# Patient Record
Sex: FEMALE | Race: WHITE | Employment: OTHER | URBAN - METROPOLITAN AREA
[De-identification: names, ages, dates, MRNs, and addresses within clinical notes are randomized per-mention and may not be internally consistent; named-entity substitution may affect disease eponyms.]

---

## 2017-07-06 ENCOUNTER — OFFICE VISIT (OUTPATIENT)
Dept: FAMILY MEDICINE | Facility: CLINIC | Age: 82
End: 2017-07-06

## 2017-07-06 VITALS
OXYGEN SATURATION: 98 % | TEMPERATURE: 97.5 F | SYSTOLIC BLOOD PRESSURE: 110 MMHG | HEIGHT: 62 IN | WEIGHT: 139.2 LBS | BODY MASS INDEX: 25.62 KG/M2 | HEART RATE: 83 BPM | DIASTOLIC BLOOD PRESSURE: 70 MMHG

## 2017-07-06 DIAGNOSIS — Z23 NEED FOR VACCINATION: ICD-10-CM

## 2017-07-06 DIAGNOSIS — Z13.9 SCREENING FOR CONDITION: ICD-10-CM

## 2017-07-06 DIAGNOSIS — Z91.89 AT RISK FOR OSTEOPOROSIS: ICD-10-CM

## 2017-07-06 DIAGNOSIS — I10 ESSENTIAL HYPERTENSION, BENIGN: ICD-10-CM

## 2017-07-06 DIAGNOSIS — E11.9 CONTROLLED TYPE 2 DIABETES MELLITUS WITHOUT COMPLICATION, WITHOUT LONG-TERM CURRENT USE OF INSULIN (H): Primary | ICD-10-CM

## 2017-07-06 LAB
ERYTHROCYTE [DISTWIDTH] IN BLOOD BY AUTOMATED COUNT: 10.7 %
HBA1C MFR BLD: 6.5 % (ref 4–7)
HCT VFR BLD AUTO: 38.1 % (ref 35–47)
HEMOGLOBIN: 13.2 G/DL (ref 11.7–15.7)
MCH RBC QN AUTO: 32.7 PG (ref 26–33)
MCHC RBC AUTO-ENTMCNC: 34.6 G/DL (ref 31–36)
MCV RBC AUTO: 94.4 FL (ref 78–100)
PLATELET COUNT - QUEST: 159 10^9/L (ref 150–375)
RBC # BLD AUTO: 4.04 10*12/L (ref 3.8–5.2)
WBC # BLD AUTO: 6.6 10*9/L (ref 4–11)

## 2017-07-06 PROCEDURE — 36415 COLL VENOUS BLD VENIPUNCTURE: CPT | Performed by: FAMILY MEDICINE

## 2017-07-06 PROCEDURE — 85027 COMPLETE CBC AUTOMATED: CPT | Performed by: FAMILY MEDICINE

## 2017-07-06 PROCEDURE — 83036 HEMOGLOBIN GLYCOSYLATED A1C: CPT | Performed by: FAMILY MEDICINE

## 2017-07-06 PROCEDURE — 99214 OFFICE O/P EST MOD 30 MIN: CPT | Performed by: FAMILY MEDICINE

## 2017-07-06 PROCEDURE — 82607 VITAMIN B-12: CPT | Mod: 90 | Performed by: FAMILY MEDICINE

## 2017-07-06 PROCEDURE — 80076 HEPATIC FUNCTION PANEL: CPT | Mod: 90 | Performed by: FAMILY MEDICINE

## 2017-07-06 PROCEDURE — 80048 BASIC METABOLIC PNL TOTAL CA: CPT | Mod: 90 | Performed by: FAMILY MEDICINE

## 2017-07-06 PROCEDURE — 80061 LIPID PANEL: CPT | Mod: 90 | Performed by: FAMILY MEDICINE

## 2017-07-06 RX ORDER — METFORMIN HCL 500 MG
500 TABLET, EXTENDED RELEASE 24 HR ORAL 2 TIMES DAILY WITH MEALS
Qty: 180 TABLET | Refills: 0 | Status: SHIPPED | OUTPATIENT
Start: 2017-07-06 | End: 2017-07-06

## 2017-07-06 RX ORDER — METFORMIN HCL 500 MG
500 TABLET, EXTENDED RELEASE 24 HR ORAL 2 TIMES DAILY WITH MEALS
Qty: 180 TABLET | Refills: 0 | COMMUNITY
Start: 2017-07-06 | End: 2017-11-30

## 2017-07-06 RX ORDER — LISINOPRIL 5 MG/1
5 TABLET ORAL DAILY
Qty: 90 TABLET | Refills: 1 | Status: SHIPPED | OUTPATIENT
Start: 2017-07-06 | End: 2018-04-07

## 2017-07-06 RX ORDER — ROSUVASTATIN CALCIUM 5 MG/1
5 TABLET, COATED ORAL DAILY
Qty: 90 TABLET | Refills: 3 | Status: SHIPPED | OUTPATIENT
Start: 2017-07-06 | End: 2018-11-09

## 2017-07-06 RX ORDER — LISINOPRIL 10 MG/1
10 TABLET ORAL DAILY
Qty: 90 TABLET | Refills: 1 | Status: CANCELLED | OUTPATIENT
Start: 2017-07-06

## 2017-07-06 RX ORDER — ROSUVASTATIN CALCIUM 5 MG/1
5 TABLET, COATED ORAL DAILY
Qty: 90 TABLET | Refills: 0 | Status: SHIPPED | OUTPATIENT
Start: 2017-07-06 | End: 2017-11-30

## 2017-07-06 NOTE — PROGRESS NOTES
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    STATIN INTOLERANT (stopped low dose pravastatin- normal CK)- recommended a Trial of low dose crestor- willing to try after discussing her risk factors for heart disease    Recheck CBC (last done 2015) on asa therapy    Problem list and histories reviewed & adjusted, as indicated.  Additional history: as documented    Patient Active Problem List   Diagnosis     Essential hypertension, benign     Disorder of bone and cartilage     Neurogenic bladder     Hyperlipidemia     Diabetes mellitus type II, controlled, with no complications (H)     ACP (advance care planning)     Lumbar disc herniation with radiculopathy     Health Care Home     Past Surgical History:   Procedure Laterality Date     C APPENDECTOMY  1996     C VAGINAL HYSTERECTOMY  2007    Hysterectomy, Vaginal     CYSTOSCOPY  2006    urethral dilation     HC COLONOSCOPY THRU STOMA, DIAGNOSTIC  5/2002    Normal     HC DILATION/CURETTAGE DIAG/THER NON OB  10/2005    Dr Fisher     SIGMOIDOSCOPY,DIAGNOSTIC  1997       Social History   Substance Use Topics     Smoking status: Never Smoker     Smokeless tobacco: Never Used     Alcohol use No     Family History   Problem Relation Age  of Onset     DIABETES Brother      DIABETES Sister      CANCER Father      lung -      Hypertension Mother      OSTEOPOROSIS Mother      bilateral hip fractures, in her 70's     Breast Cancer Sister      CANCER Sister      leukemia         Current Outpatient Prescriptions   Medication Sig Dispense Refill     rosuvastatin (CRESTOR) 5 MG tablet Take 1 tablet (5 mg) by mouth daily 90 tablet 3     lisinopril (PRINIVIL/ZESTRIL) 5 MG tablet Take 1 tablet (5 mg) by mouth daily 90 tablet 1     rosuvastatin (CRESTOR) 5 MG tablet Take 1 tablet (5 mg) by mouth daily 90 tablet 0     metFORMIN (GLUCOPHAGE-XR) 500 MG 24 hr tablet Take 1 tablet (500 mg) by mouth 2 times daily (with meals) 180 tablet 0     sulfamethoxazole-trimethoprim (BACTRIM,SEPTRA) 400-80 MG per tablet Take 1 tablet by mouth 2 times daily 20 tablet 0     Cyanocobalamin (B-12) 1000 MCG CAPS        glucose blood VI test strips (ONE TOUCH ULTRA TEST) strip 1 Box by In Vitro route daily. 100 strip prn     fish oil-omega-3 fatty acids (FISH OIL) 1000 MG capsule Take 2 g by mouth daily.       ASPIRIN 81 MG OR TABS TWO DAILY 100 3     [DISCONTINUED] metFORMIN (GLUCOPHAGE-XR) 500 MG 24 hr tablet Take 1 tablet (500 mg) by mouth 2 times daily (with meals) 180 tablet 0     [DISCONTINUED] lisinopril (PRINIVIL,ZESTRIL) 10 MG tablet Take 1 tablet (10 mg) by mouth daily 90 tablet 1     [DISCONTINUED] metFORMIN (GLUCOPHAGE-XR) 500 MG 24 hr tablet Take 1 tablet (500 mg) by mouth 2 times daily (with meals) 180 tablet 0       Reviewed and updated as needed this visit by clinical staff       Reviewed and updated as needed this visit by Provider             ROS:  C: NEGATIVE for fever, chills, change in weight  E/M: NEGATIVE for ear, mouth and throat problems  R: NEGATIVE for significant cough or SOB  CV: NEGATIVE for chest pain, palpitations or peripheral edema  Self cath: had some antibiotics/self treated for - recent UTI    OBJECTIVE:     /70 (BP Location: Right  "arm, Patient Position: Chair, Cuff Size: Adult Regular)  Pulse 83  Temp 97.5  F (36.4  C) (Oral)  Ht 1.581 m (5' 2.25\")  Wt 63.1 kg (139 lb 3.2 oz)  SpO2 98%  Breastfeeding? No  BMI 25.26 kg/m2  Body mass index is 25.26 kg/(m^2).     136/70 on left arm    Home pressures:  129/79  113/72  112/67  117/70  Regular rate and  rhythm. S1 and S2 normal, no murmurs, clicks, gallops or rubs. No edema or JVD. Chest is clear; no wheezes or rales.    Full peripheral pulses- she denies symptoms of claudication  Normal foot exam including filament testing    Diagnostic Test Results:  Results for orders placed or performed in visit on 17 (from the past 24 hour(s))   HEMOGRAM/PLATELET (BFP)   Result Value Ref Range    WBC 6.6 4.0 - 11 10*9/L    RBC Count 4.04 3.8 - 5.2 10*12/L    Hemoglobin 13.2 11.7 - 15.7 g/dL    Hematocrit 38.1 35.0 - 47.0 %    MCV 94.4 78 - 100 fL    MCH 32.7 26 - 33 pg    MCHC 34.6 31 - 36 g/dL    RDW 10.7 %    Platelet Count 159 150 - 375 10^9/L    Narrative    Ran Test 3 times - [facility]   Hemoglobin A1c (BFP)   Result Value Ref Range    Hemoglobin A1C 6.5 4.0 - 7.0 %       Mother  of complications of osteoporosis age 84- DEXA recommended    ASSESSMENT/PLAN:     Problem List Items Addressed This Visit     Diabetes mellitus type II, controlled, with no complications (H) - Primary    Relevant Orders    BASIC METABOLIC PANEL (QUEST)    Lipid Profile    Hemoglobin A1c (BFP) (Completed)    VENOUS COLLECTION (Completed)    HEPATIC PANEL (QUEST)    HEMOGRAM/PLATELET (BFP) (Completed)      Other Visit Diagnoses     Need for vaccination             (discussed need for tetanus booster- she will get at drug store)        MEDICATIONS:        - Resume metformin       - Trial of Crestor- recheck fasting lipid profile in 3 months       - Decrease lisinopril to 5 mg       - Continue other medications without change  FUTURE APPOINTMENTS:       - Follow-up visit in 3 months with fasting labs and recheck blood " pressure    Schedule DEXA SCAN    Loraine Garvey MD  [facility]

## 2017-07-06 NOTE — MR AVS SNAPSHOT
"              After Visit Summary   7/6/2017    Amina Matute    MRN: [id number]           Patient Information     Date Of Birth          1935        Visit Information        Provider Department      7/6/2017 8:45 AM Loraine Garvey MD [facility]        Today's Diagnoses     Controlled type 2 diabetes mellitus without complication, without long-term current use of insulin (H)    -  1    Need for vaccination        Screening for condition          Care Instructions    Due for tetanus shot (Boostix) tetanus , diptheria, pertussis-    Prevnar (pneumonia)  vaccination after November 4    Keep B12    New medication : Crestor (rosuvastatin)- for cholesterol    Continue metformin    Lisinopril dose reduced to 5 mg    RECHECK IN THREE MONTHS            Follow-ups after your visit        Who to contact     If you have questions or need follow up information about today's clinic visit or your schedule please contact [facility] directly at [phone].  Normal or non-critical lab and imaging results will be communicated to you by MyChart, letter or phone within 4 business days after the clinic has received the results. If you do not hear from us within 7 days, please contact the clinic through MyChart or phone. If you have a critical or abnormal lab result, we will notify you by phone as soon as possible.  Submit refill requests through [employer] or call your pharmacy and they will forward the refill request to us. Please allow 3 business days for your refill to be completed.          Additional Information About Your Visit        Care EveryWhere ID     This is your Care EveryWhere ID. This could be used by other organizations to access your [place] medical records  [id number]        Your Vitals Were     Pulse Temperature Height Pulse Oximetry Breastfeeding? BMI (Body Mass Index)    83 97.5  F (36.4  C) (Oral) 1.581 m (5' 2.25\") 98% No 25.26 kg/m2       Blood " Pressure from Last 3 Encounters:   07/06/17 110/70   12/27/16 158/64   11/04/16 114/80    Weight from Last 3 Encounters:   07/06/17 63.1 kg (139 lb 3.2 oz)   12/27/16 67.9 kg (149 lb 12.8 oz)   11/04/16 64.4 kg (142 lb)              We Performed the Following     BASIC METABOLIC PANEL (QUEST)     Hemoglobin A1c (BFP)     HEMOGRAM/PLATELET (BFP)     HEPATIC PANEL (QUEST)     Lipid Profile     VENOUS COLLECTION     VITAMIN B12 (QUEST)          Today's Medication Changes          These changes are accurate as of: 7/6/17  9:34 AM.  If you have any questions, ask your nurse or doctor.               Start taking these medicines.        Dose/Directions    * rosuvastatin 5 MG tablet   Commonly known as:  CRESTOR   Used for:  Controlled type 2 diabetes mellitus without complication, without long-term current use of insulin (H)   Started by:  Loraine Garvey MD        Dose:  5 mg   Take 1 tablet (5 mg) by mouth daily   Quantity:  90 tablet   Refills:  3       * rosuvastatin 5 MG tablet   Commonly known as:  CRESTOR   Used for:  Controlled type 2 diabetes mellitus without complication, without long-term current use of insulin (H)   Started by:  Loraine Garvey MD        Dose:  5 mg   Take 1 tablet (5 mg) by mouth daily   Quantity:  90 tablet   Refills:  0       * Notice:  This list has 2 medication(s) that are the same as other medications prescribed for you. Read the directions carefully, and ask your doctor or other care provider to review them with you.      These medicines have changed or have updated prescriptions.        Dose/Directions    lisinopril 5 MG tablet   Commonly known as:  PRINIVIL/ZESTRIL   This may have changed:    - medication strength  - how much to take   Used for:  Controlled type 2 diabetes mellitus without complication, without long-term current use of insulin (H)   Changed by:  Loraine Garvey MD        Dose:  5 mg   Take 1 tablet (5 mg) by mouth daily   Quantity:  90 tablet   Refills:  1          Stop taking these medicines if you haven't already. Please contact your care team if you have questions.     metFORMIN 500 MG 24 hr tablet   Commonly known as:  GLUCOPHAGE-XR   Stopped by:  Loraine Garvey MD                Where to get your medicines      These medications were sent to [id number] - [facility] [address]  [address] [place] [address]     Phone:  [phone]     lisinopril 5 MG tablet    rosuvastatin 5 MG tablet    rosuvastatin 5 MG tablet                Primary Care Provider Office Phone # Fax #    Loraine Garvey [phone]       [facility] [address]   [facility] [place] [address]        Equal Access to Services     MORRIS MARCANO : Hadii aad ku hadasho Soomaali, waaxda luqadaha, qaybta kaalmada adeegyada, waxay idiin haycharlyn angel bruno . So [facility] [phone].    ATENCIÓN: Si habla español, tiene a carreno disposición servicios gratuitos de asistencia lingüística. Ll[facility] [phone].    We comply with applicable federal civil rights laws and [place] laws. We do not discriminate on the basis of race, color, national origin, age, disability sex, sexual orientation or gender identity.            Thank you!     Thank you for choosing [facility]  for your care. Our goal is always to provide you with excellent care. Hearing back from our patients is one way we can continue to improve our services. Please take a few minutes to complete the written survey that you may receive in the mail after your visit with us. Thank you!             Your Updated Medication List - Protect others around you: Learn how to safely use, store and throw away your medicines at www.disposemymeds.org.          This list is accurate as of: 7/6/17  9:34 AM.  Always use your most recent med list.                   Brand Name Dispense Instructions for use Diagnosis    aspirin 81 MG tablet     100    TWO  DAILY    Essential hypertension, benign, Other and unspecified hyperlipidemia       B-12 1000 MCG Caps           blood glucose monitoring test strip    ONE TOUCH ULTRA    100 strip    1 Box by In Vitro route daily.    Type II or unspecified type diabetes mellitus without mention of complication, not stated as uncontrolled       fish oil-omega-3 fatty acids 1000 MG capsule      Take 2 g by mouth daily.    Other and unspecified hyperlipidemia       lisinopril 5 MG tablet    PRINIVIL/ZESTRIL    90 tablet    Take 1 tablet (5 mg) by mouth daily    Controlled type 2 diabetes mellitus without complication, without long-term current use of insulin (H)       * rosuvastatin 5 MG tablet    CRESTOR    90 tablet    Take 1 tablet (5 mg) by mouth daily    Controlled type 2 diabetes mellitus without complication, without long-term current use of insulin (H)       * rosuvastatin 5 MG tablet    CRESTOR    90 tablet    Take 1 tablet (5 mg) by mouth daily    Controlled type 2 diabetes mellitus without complication, without long-term current use of insulin (H)       sulfamethoxazole-trimethoprim 400-80 MG per tablet    BACTRIM/SEPTRA    20 tablet    Take 1 tablet by mouth 2 times daily    Urinary tract infection, site unspecified       * Notice:  This list has 2 medication(s) that are the same as other medications prescribed for you. Read the directions carefully, and ask your doctor or other care provider to review them with you.

## 2017-07-06 NOTE — LETTER
[facility]  [facility] [address]  [place]  [address]    July 12, 2017        Amina Matute  [address]  [facility] [address]              Dear Amina Matute      LAB RESULTS:     The results of your recent CBC Panel,  Kidney Tests, Liver Panel and Potassium were NORMAL.     Lipid profile is abnormal/ a trial of Crestor is recommended - repeat fasting lipid profile in three months.    Resume Metformin despite a Hemoglobin A1C of 6.5 showing good control of your diabetis.     If you have any further questions or problems, please contact our office at [phone].          Loraine Garvey MD

## 2017-07-06 NOTE — PATIENT INSTRUCTIONS
Due for tetanus shot (Boostix) tetanus , diptheria, pertussis-    Prevnar (pneumonia)  vaccination after November 4    Keep B12    New medication : Crestor (rosuvastatin)- for cholesterol    Continue metformin    Lisinopril dose reduced to 5 mg    RECHECK IN THREE MONTHS

## 2017-07-07 LAB
ALBUMIN SERPL-MCNC: 4.2 G/DL (ref 3.6–5.1)
ALBUMIN/GLOB SERPL: 1.4 (CALC) (ref 1–2.5)
ALP SERPL-CCNC: 97 U/L (ref 33–130)
ALT SERPL-CCNC: 10 U/L (ref 6–29)
AST SERPL-CCNC: 13 U/L (ref 10–35)
BILIRUB SERPL-MCNC: 0.4 MG/DL (ref 0.2–1.2)
BILIRUBIN, DIRECT: 0.1 MG/DL (ref 0–0.5)
BILIRUBIN,INDIRECT: 0.3 MG/DL (CALC) (ref 0.2–1.2)
BUN SERPL-MCNC: 22 MG/DL (ref 7–25)
BUN/CREATININE RATIO: 24 (CALC) (ref 6–22)
CALCIUM SERPL-MCNC: 9.7 MG/DL (ref 8.6–10.4)
CHLORIDE SERPLBLD-SCNC: 103 MMOL/L (ref 98–110)
CHOLEST SERPL-MCNC: 244 MG/DL (ref 125–200)
CHOLEST/HDLC SERPL: 4 (CALC)
CO2 SERPL-SCNC: 22 MMOL/L (ref 20–31)
CREAT SERPL-MCNC: 0.91 MG/DL (ref 0.6–0.88)
EGFR AFRICAN AMERICAN - QUEST: 69 ML/MIN/1.73M2
GFR SERPL CREATININE-BSD FRML MDRD: 60 ML/MIN/1.73M2
GLOBULIN, CALCULATED - QUEST: 2.9 G/DL (CALC) (ref 1.9–3.7)
GLUCOSE - QUEST: 124 MG/DL (ref 65–99)
HDLC SERPL-MCNC: 61 MG/DL
LDLC SERPL CALC-MCNC: 152 MG/DL (CALC)
NONHDLC SERPL-MCNC: 183 MG/DL (CALC)
POTASSIUM SERPL-SCNC: 4.5 MMOL/L (ref 3.5–5.3)
PROT SERPL-MCNC: 7.1 G/DL (ref 6.1–8.1)
SODIUM SERPL-SCNC: 138 MMOL/L (ref 135–146)
TRIGL SERPL-MCNC: 156 MG/DL
VIT B12 SERPL-MCNC: 679 PG/ML (ref 200–1100)

## 2017-07-14 ENCOUNTER — OFFICE VISIT (OUTPATIENT)
Dept: FAMILY MEDICINE | Facility: CLINIC | Age: 82
End: 2017-07-14

## 2017-07-14 VITALS
BODY MASS INDEX: 25.22 KG/M2 | WEIGHT: 139 LBS | DIASTOLIC BLOOD PRESSURE: 70 MMHG | HEART RATE: 71 BPM | SYSTOLIC BLOOD PRESSURE: 134 MMHG | OXYGEN SATURATION: 99 % | TEMPERATURE: 97.7 F

## 2017-07-14 DIAGNOSIS — Z91.89 AT RISK FOR OSTEOPOROSIS: ICD-10-CM

## 2017-07-14 DIAGNOSIS — N39.0 URINARY TRACT INFECTION, SITE UNSPECIFIED: ICD-10-CM

## 2017-07-14 PROCEDURE — 77080 DXA BONE DENSITY AXIAL: CPT | Mod: GA | Performed by: FAMILY MEDICINE

## 2017-07-14 PROCEDURE — 99212 OFFICE O/P EST SF 10 MIN: CPT | Performed by: FAMILY MEDICINE

## 2017-07-14 RX ORDER — SULFAMETHOXAZOLE AND TRIMETHOPRIM 400; 80 MG/1; MG/1
1 TABLET ORAL 2 TIMES DAILY
Qty: 20 TABLET | Refills: 0 | Status: SHIPPED | OUTPATIENT
Start: 2017-07-14 | End: 2017-11-30

## 2017-07-14 NOTE — PROGRESS NOTES
SUBJECTIVE:  Reviewed her dexa scan results- scanned in epic    T score: normal spine  -1.2 total hip (nearly 9 per cent decline in the last 7 years)  -1.8 femoral neck (down from -1.3 in 7 years    Treatment:  Vitamin D 1000 IU daily - she is not taking  Calcium- three dairy products a day  Regular weight bearing exercise- she is physically active    Recheck DEXA 3-5 years    She also requests refill of Bactrim for infrequent UTI related to self cath    More than 50% of visit spent in counseling.

## 2017-07-14 NOTE — MR AVS SNAPSHOT
After Visit Summary   7/14/2017    Amina Matute    MRN: [id number]           Patient Information     Date Of Birth          1935        Visit Information        Provider Department      7/14/2017 11:30 AM Loraine Garvey MD [facility]        Today's Diagnoses     At risk for osteoporosis        Urinary tract infection, site unspecified           Follow-ups after your visit        Who to contact     If you have questions or need follow up information about today's clinic visit or your schedule please contact [facility] directly at [phone].  Normal or non-critical lab and imaging results will be communicated to you by MyChart, letter or phone within 4 business days after the clinic has received the results. If you do not hear from us within 7 days, please contact the clinic through MyChart or phone. If you have a critical or abnormal lab result, we will notify you by phone as soon as possible.  Submit refill requests through [employer] or call your pharmacy and they will forward the refill request to us. Please allow 3 business days for your refill to be completed.          Additional Information About Your Visit        Care EveryWhere ID     This is your Care EveryWhere ID. This could be used by other organizations to access your [place] medical records  [id number]        Your Vitals Were     Pulse Temperature Pulse Oximetry Breastfeeding? BMI (Body Mass Index)       71 97.7  F (36.5  C) (Oral) 99% No 25.22 kg/m2        Blood Pressure from Last 3 Encounters:   07/14/17 134/70   07/06/17 110/70   12/27/16 158/64    Weight from Last 3 Encounters:   07/14/17 63 kg (139 lb)   07/06/17 63.1 kg (139 lb 3.2 oz)   12/27/16 67.9 kg (149 lb 12.8 oz)              We Performed the Following     Dexa hip/pelvis/spine*          Where to get your medicines      These medications were sent to [facility] [address] [place] [address] [address] [place] [address]  [address] [address]     Phone:  [phone]     sulfamethoxazole-trimethoprim 400-80 MG per tablet          Primary Care Provider Office Phone # Fax #    Loraine Garvey [phone]       [facility] [address] [facility] [address]  [facility] [address]        Equal Access to Services     [facility]: Hadii aad ku hadasho Soomaali, waaxda luqadaha, qaybta kaalmada adeegyada, waxay idiin hayaan adeeg kharash la'aan . So [facility] [phone].    ATENCIÓN: Si habla español, tiene a carreno disposición servicios gratuitos de asistencia lingüística. Llame al [phone].    We comply with applicable federal civil rights laws and [place] laws. We do not discriminate on the basis of race, color, national origin, age, disability sex, sexual orientation or gender identity.            Thank you!     Thank you for choosing [facility]  for your care. Our goal is always to provide you with excellent care. Hearing back from our patients is one way we can continue to improve our services. Please take a few minutes to complete the written survey that you may receive in the mail after your visit with us. Thank you!             Your Updated Medication List - Protect others around you: Learn how to safely use, store and throw away your medicines at www.disposemymeds.org.          This list is accurate as of: 7/14/17 12:01 PM.  Always use your most recent med list.                   Brand Name Dispense Instructions for use Diagnosis    aspirin 81 MG tablet     100    TWO DAILY    Essential hypertension, benign, Other and unspecified hyperlipidemia       B-12 1000 MCG Caps           blood glucose monitoring test strip    ONE TOUCH ULTRA    100 strip    1 Box by In Vitro route daily.    Type II or unspecified type diabetes mellitus without mention of complication, not stated as uncontrolled       fish oil-omega-3 fatty acids 1000 MG capsule      Take 2 g by  mouth daily.    Other and unspecified hyperlipidemia       lisinopril 5 MG tablet    PRINIVIL/ZESTRIL    90 tablet    Take 1 tablet (5 mg) by mouth daily    Controlled type 2 diabetes mellitus without complication, without long-term current use of insulin (H), Essential hypertension, benign       metFORMIN 500 MG 24 hr tablet    GLUCOPHAGE-XR    180 tablet    Take 1 tablet (500 mg) by mouth 2 times daily (with meals)    Controlled type 2 diabetes mellitus without complication, without long-term current use of insulin (H)       * rosuvastatin 5 MG tablet    CRESTOR    90 tablet    Take 1 tablet (5 mg) by mouth daily    Controlled type 2 diabetes mellitus without complication, without long-term current use of insulin (H)       * rosuvastatin 5 MG tablet    CRESTOR    90 tablet    Take 1 tablet (5 mg) by mouth daily    Controlled type 2 diabetes mellitus without complication, without long-term current use of insulin (H)       sulfamethoxazole-trimethoprim 400-80 MG per tablet    BACTRIM/SEPTRA    20 tablet    Take 1 tablet by mouth 2 times daily    Urinary tract infection, site unspecified       * Notice:  This list has 2 medication(s) that are the same as other medications prescribed for you. Read the directions carefully, and ask your doctor or other care provider to review them with you.

## 2017-07-14 NOTE — NURSING NOTE
Amina is here for the Results of a DEXA scan.     Pre-Visit Screening :  Immunizations : up to date    Colonoscopy : Not needed  Mammogram : Not needed  Asthma Action Test/Plan : NA  PHQ9/GAD7 :  NA    Pulse - regular  My Chart - declines    CLASSIFICATION OF OVERWEIGHT AND OBESITY BY BMI                         Obesity Class           BMI(kg/m2)  Underweight                                    < 18.5  Normal                                         18.5-24.9  Overweight                                     25.0-29.9  OBESITY                     I                  30.0-34.9                              II                 35.0-39.9  EXTREME OBESITY             III                >40                             Patient's  BMI Body mass index is 25.22 kg/(m^2).  http://hin.nhlbi.nih.gov/menuplanner/menu.cgi  Questioned patient about current smoking habits.  Pt. has never smoked.    JULIAN Mendiola ([facility])

## 2017-08-25 DIAGNOSIS — N39.0 URINARY TRACT INFECTION, SITE UNSPECIFIED: ICD-10-CM

## 2017-08-25 RX ORDER — SULFAMETHOXAZOLE AND TRIMETHOPRIM 400; 80 MG/1; MG/1
TABLET ORAL
Qty: 20 TABLET | Refills: 0 | OUTPATIENT
Start: 2017-08-25

## 2017-08-25 NOTE — TELEPHONE ENCOUNTER
Refused Prescriptions:                       Disp   Refills    sulfamethoxazole-trimethoprim (BACTRIM/SEP*20 tab*0        Sig: TAKE ONE TABLET BY MOUTH TWICE DAILY  Refused By: FLOR SOMMERS  Reason for Refusal: OTHER    This was given for a UTI  Pt needs ov if still having sx  Lauren  [phone] (home)

## 2017-10-05 ENCOUNTER — OFFICE VISIT (OUTPATIENT)
Dept: FAMILY MEDICINE | Facility: CLINIC | Age: 82
End: 2017-10-05

## 2017-10-05 VITALS
DIASTOLIC BLOOD PRESSURE: 62 MMHG | HEART RATE: 77 BPM | BODY MASS INDEX: 25.58 KG/M2 | TEMPERATURE: 98.3 F | SYSTOLIC BLOOD PRESSURE: 142 MMHG | OXYGEN SATURATION: 98 % | WEIGHT: 139 LBS | HEIGHT: 62 IN

## 2017-10-05 DIAGNOSIS — G57.01 PYRIFORMIS SYNDROME, RIGHT: Primary | ICD-10-CM

## 2017-10-05 DIAGNOSIS — Z23 NEED FOR VACCINATION: ICD-10-CM

## 2017-10-05 DIAGNOSIS — M48.061 SPINAL STENOSIS, LUMBAR REGION, WITHOUT NEUROGENIC CLAUDICATION: ICD-10-CM

## 2017-10-05 PROCEDURE — G0008 ADMIN INFLUENZA VIRUS VAC: HCPCS | Performed by: FAMILY MEDICINE

## 2017-10-05 PROCEDURE — 99214 OFFICE O/P EST MOD 30 MIN: CPT | Mod: 25 | Performed by: FAMILY MEDICINE

## 2017-10-05 PROCEDURE — 90686 IIV4 VACC NO PRSV 0.5 ML IM: CPT | Performed by: FAMILY MEDICINE

## 2017-10-05 NOTE — MR AVS SNAPSHOT
After Visit Summary   10/5/2017    Amina Matute    MRN: [id number]           Patient Information     Date Of Birth          1935        Visit Information        Provider Department      10/5/2017 9:45 AM Loraine Garvey MD [facility]        Today's Diagnoses     Need for vaccination    -  1    Pyriformis syndrome, right        Spinal stenosis, lumbar region, without neurogenic claudication          Care Instructions    Aleve: 2 tablets breakfast and two with dinner    FIVE DAYS ONLY  CAN STOP IF PAIN IS GONE    Physical therapy referral          Follow-ups after your visit        Additional Services     [facility] PT, HAND, AND CHIROPRACTIC REFERRAL       **This order will print in the [facility] Scheduling Office**    Physical Therapy, Hand Therapy and Chiropractic Care are available through:    *[facility]  *[facility]  *[facility]    Call one number to schedule at any of the above locations: [phone].    Your provider has referred you to: Physical Therapy at [facility] or [facility]    Indication/Reason for Referral: Low Back Pain/ right buttock pain  Onset of Illness: 5-6 weeks  Therapy Orders: Evaluate and Treat  Special Programs: none  Special Request: none    Aisha Leonard      Additional Comments for the Therapist or Chiropractor:     Please be aware that coverage of these services is subject to the terms and limitations of your health insurance plan.  Call member services at your health plan with any benefit or coverage questions.      Please bring the following to your appointment:    *Your personal calendar for scheduling future appointments  *Comfortable clothing                  Who to contact     If you have questions or need follow up information about today's clinic visit or your schedule please contact [facility] directly at [phone].  Normal or non-critical lab and imaging results  "will be communicated to you by MyChart, letter or phone within 4 business days after the clinic has received the results. If you do not hear from us within 7 days, please contact the clinic through MyChart or phone. If you have a critical or abnormal lab result, we will notify you by phone as soon as possible.  Submit refill requests through IForemhart or call your pharmacy and they will forward the refill request to us. Please allow 3 business days for your refill to be completed.          Additional Information About Your Visit        Care EveryWhere ID     This is your Care EveryWhere ID. This could be used by other organizations to access your [place] medical records  [id number]        Your Vitals Were     Pulse Temperature Height Pulse Oximetry Breastfeeding? BMI (Body Mass Index)    77 98.3  F (36.8  C) (Oral) 1.581 m (5' 2.25\") 98% No 25.22 kg/m2       Blood Pressure from Last 3 Encounters:   10/05/17 142/62   07/14/17 134/70   07/06/17 110/70    Weight from Last 3 Encounters:   10/05/17 63 kg (139 lb)   07/14/17 63 kg (139 lb)   07/06/17 63.1 kg (139 lb 3.2 oz)              We Performed the Following     HC FLU VAC PRESRV FREE QUAD SPLIT VIR 3+YRS IM     [facility] PT, HAND, AND CHIROPRACTIC REFERRAL     VACCINE ADMINISTRATION, INITIAL        Primary Care Provider Office Phone # Fax #    Loraine Garvey [phone] [address] [address]        Equal Access to Services     [facility] AH: Hadii aad ku hadasho Soomaali, waaxda luqadaha, qaybta kaalmada adeegyada, waxay perfecto bruno . So [facility] [phone].    ATENCIÓN: Si bertrandla lupe, tiene a carreno disposición servicios gratuitos de asistencia lingüística. Llame al [phone].    We comply with applicable federal civil rights laws and [place] laws. We do not discriminate on the basis of race, color, national origin, age, disability, sex, sexual orientation, or gender identity.          "   Thank you!     Thank you for choosing [facility]  for your care. Our goal is always to provide you with excellent care. Hearing back from our patients is one way we can continue to improve our services. Please take a few minutes to complete the written survey that you may receive in the mail after your visit with us. Thank you!             Your Updated Medication List - Protect others around you: Learn how to safely use, store and throw away your medicines at www.disposemymeds.org.          This list is accurate as of: 10/5/17 10:34 AM.  Always use your most recent med list.                   Brand Name Dispense Instructions for use Diagnosis    aspirin 81 MG tablet     100    TWO DAILY    Essential hypertension, benign, Other and unspecified hyperlipidemia       B-12 1000 MCG Caps           blood glucose monitoring test strip    ONE TOUCH ULTRA    100 strip    1 Box by In Vitro route daily.    Type II or unspecified type diabetes mellitus without mention of complication, not stated as uncontrolled       fish oil-omega-3 fatty acids 1000 MG capsule      Take 2 g by mouth daily.    Other and unspecified hyperlipidemia       lisinopril 5 MG tablet    PRINIVIL/ZESTRIL    90 tablet    Take 1 tablet (5 mg) by mouth daily    Controlled type 2 diabetes mellitus without complication, without long-term current use of insulin (H), Essential hypertension, benign       metFORMIN 500 MG 24 hr tablet    GLUCOPHAGE-XR    180 tablet    Take 1 tablet (500 mg) by mouth 2 times daily (with meals)    Controlled type 2 diabetes mellitus without complication, without long-term current use of insulin (H)       * rosuvastatin 5 MG tablet    CRESTOR    90 tablet    Take 1 tablet (5 mg) by mouth daily    Controlled type 2 diabetes mellitus without complication, without long-term current use of insulin (H)       * rosuvastatin 5 MG tablet    CRESTOR    90 tablet    Take 1 tablet (5 mg) by mouth daily    Controlled  type 2 diabetes mellitus without complication, without long-term current use of insulin (H)       sulfamethoxazole-trimethoprim 400-80 MG per tablet    BACTRIM/SEPTRA    20 tablet    Take 1 tablet by mouth 2 times daily    Urinary tract infection, site unspecified       * Notice:  This list has 2 medication(s) that are the same as other medications prescribed for you. Read the directions carefully, and ask your doctor or other care provider to review them with you.

## 2017-10-05 NOTE — NURSING NOTE
Amina is here back pain-Right side gluteus and is wondering if she should get an MRI for this.   Pre-Visit Screening :  Immunizations : up to date    Colonoscopy :Not needed  Mammogram : Not needed  Asthma Action Test/Plan : NA  PHQ9/GAD7 :  NA    Pulse - regular  My Chart - declines    CLASSIFICATION OF OVERWEIGHT AND OBESITY BY BMI                         Obesity Class           BMI(kg/m2)  Underweight                                    < 18.5  Normal                                         18.5-24.9  Overweight                                     25.0-29.9  OBESITY                     I                  30.0-34.9                              II                 35.0-39.9  EXTREME OBESITY             III                >40                             Patient's  BMI Body mass index is 25.22 kg/(m^2).  http://hin.nhlbi.nih.gov/menuplanner/menu.cgi  Questioned patient about current smoking habits.  Pt.never smoked    Amy.JULIAN Lund ([facility])

## 2017-10-05 NOTE — PATIENT INSTRUCTIONS
Aleve: 2 tablets breakfast and two with dinner    FIVE DAYS ONLY  CAN STOP IF PAIN IS GONE    Physical therapy referral

## 2017-11-03 ENCOUNTER — TELEPHONE (OUTPATIENT)
Dept: FAMILY MEDICINE | Facility: CLINIC | Age: 82
End: 2017-11-03

## 2017-11-03 NOTE — TELEPHONE ENCOUNTER
Dr. Garvey,     We have received Orders from [facility] for Amina regarding Physical Therapy Outpatient PT Eval & Plan of Treatment.     Please review and sign     This is in your box.     JULIAN Mendiola ([facility])

## 2017-11-06 NOTE — TELEPHONE ENCOUNTER
Orders have been signed, faxed and ready for scanning.     Amy.JULIAN Lund ([facility])

## 2017-11-30 ENCOUNTER — OFFICE VISIT (OUTPATIENT)
Dept: FAMILY MEDICINE | Facility: CLINIC | Age: 82
End: 2017-11-30

## 2017-11-30 ENCOUNTER — TRANSFERRED RECORDS (OUTPATIENT)
Dept: FAMILY MEDICINE | Facility: CLINIC | Age: 82
End: 2017-11-30

## 2017-11-30 VITALS
HEART RATE: 86 BPM | DIASTOLIC BLOOD PRESSURE: 62 MMHG | TEMPERATURE: 97.6 F | HEIGHT: 62 IN | BODY MASS INDEX: 25.43 KG/M2 | OXYGEN SATURATION: 95 % | WEIGHT: 138.2 LBS | SYSTOLIC BLOOD PRESSURE: 114 MMHG

## 2017-11-30 DIAGNOSIS — N31.9 NEUROGENIC BLADDER: ICD-10-CM

## 2017-11-30 DIAGNOSIS — E11.9 CONTROLLED TYPE 2 DIABETES MELLITUS WITHOUT COMPLICATION, WITHOUT LONG-TERM CURRENT USE OF INSULIN (H): Primary | ICD-10-CM

## 2017-11-30 DIAGNOSIS — I10 ESSENTIAL HYPERTENSION, BENIGN: ICD-10-CM

## 2017-11-30 DIAGNOSIS — Z23 NEED FOR VACCINATION: ICD-10-CM

## 2017-11-30 LAB — HBA1C MFR BLD: 6.2 % (ref 4–7)

## 2017-11-30 PROCEDURE — 80061 LIPID PANEL: CPT | Mod: 90 | Performed by: FAMILY MEDICINE

## 2017-11-30 PROCEDURE — 80048 BASIC METABOLIC PNL TOTAL CA: CPT | Mod: 90 | Performed by: FAMILY MEDICINE

## 2017-11-30 PROCEDURE — 99214 OFFICE O/P EST MOD 30 MIN: CPT | Mod: 25 | Performed by: FAMILY MEDICINE

## 2017-11-30 PROCEDURE — 84460 ALANINE AMINO (ALT) (SGPT): CPT | Mod: 90 | Performed by: FAMILY MEDICINE

## 2017-11-30 PROCEDURE — G0009 ADMIN PNEUMOCOCCAL VACCINE: HCPCS | Performed by: FAMILY MEDICINE

## 2017-11-30 PROCEDURE — 36415 COLL VENOUS BLD VENIPUNCTURE: CPT | Performed by: FAMILY MEDICINE

## 2017-11-30 PROCEDURE — 90670 PCV13 VACCINE IM: CPT | Performed by: FAMILY MEDICINE

## 2017-11-30 PROCEDURE — 83036 HEMOGLOBIN GLYCOSYLATED A1C: CPT | Performed by: FAMILY MEDICINE

## 2017-11-30 RX ORDER — METFORMIN HCL 500 MG
1000 TABLET, EXTENDED RELEASE 24 HR ORAL
Qty: 180 TABLET | Refills: 1 | Status: SHIPPED | OUTPATIENT
Start: 2017-11-30 | End: 2018-04-20

## 2017-11-30 NOTE — LETTER
December 2, 2017      Amina Matute  [address]  [place] [address]        Dear ,    We are writing to inform you of your test results.    Your test results fall within the expected range(s) or remain unchanged from previous results.  Please continue with current treatment plan.    Resulted Orders   Lipid Profile   Result Value Ref Range    Cholesterol 190 <200 mg/dL    HDL Cholesterol 69 >50 mg/dL    Triglycerides 119 <150 mg/dL    LDL Cholesterol Calculated 99 mg/dL (calc)      Comment:      Reference range: <100     Desirable range <100 mg/dL for patients with CHD or  diabetes and <70 mg/dL for diabetic patients with  known heart disease.     LDL-C is now calculated using the Peggy   calculation, which is a validated novel method providing   better accuracy than the Friedewald equation in the   estimation of LDL-C.   Herberth CALLE et al. MARY BETH. 2013;310(19): 9983-2540   (http://education.[employer]/faq/AQM242)      Cholesterol/HDL Ratio 2.8 <5.0 (calc)    Non HDL Cholesterol 121 <130 mg/dL (calc)      Comment:      For patients with diabetes plus 1 major ASCVD risk   factor, treating to a non-HDL-C goal of <100 mg/dL   (LDL-C of <70 mg/dL) is considered a therapeutic   option.     ALT (QUEST)   Result Value Ref Range    ALT 11 6 - 29 U/L   BASIC METABOLIC PANEL (QUEST)   Result Value Ref Range    Glucose 106 (H) 65 - 99 mg/dL      Comment:                    Fasting reference interval     For someone without known diabetes, a glucose value  between 100 and 125 mg/dL is consistent with  prediabetes and should be confirmed with a  follow-up test.         Urea Nitrogen 21 7 - 25 mg/dL    Creatinine 0.93 (H) 0.60 - 0.88 mg/dL      Comment:      For patients >49 years of age, the reference limit  for Creatinine is approximately 13% higher for people  identified as -American.         GFR Estimate 58 (L) > OR = 60 mL/min/1.73m2    EGFR African American 67 > OR = 60  mL/min/1.73m2    BUN/Creatinine Ratio 23 (H) 6 - 22 (calc)    Sodium 138 135 - 146 mmol/L    Potassium 4.6 3.5 - 5.3 mmol/L    Chloride 101 98 - 110 mmol/L    Carbon Dioxide 24 20 - 31 mmol/L    Calcium 9.8 8.6 - 10.4 mg/dL   Hemoglobin A1c (BFP)   Result Value Ref Range    Hemoglobin A1C 6.2 4.0 - 7.0 %       If you have any questions or concerns, please call the clinic at the number listed above.       Sincerely,        Loraine Garvey MD

## 2017-11-30 NOTE — PROGRESS NOTES
SUBJECTIVE:   Amina Matute is a 82 year old female who presents to clinic today for the following health issues: Diabetes Follow-up      Patient is checking blood sugars:  Not checking    Diabetic concerns: None     Symptoms of hypoglycemia (low blood sugar): none     Paresthesias (numbness or burning in feet) or sores: No     Date of last diabetic eye exam:  Reminded to schedule    Hyperlipidemia Follow-Up      Rate your low fat/cholesterol diet?: good    Taking statin?  Yes, no muscle aches from statin    Other lipid medications/supplements?:  none    Hypertension Follow-up      Outpatient blood pressures are being checked at home infrequenty.  Results are Normal.    Low Salt Diet: no added salt        Amount of exercise or physical activity: staying active/ steps    Walking [place]: will stay now through April    Problems taking medications regularly: No    Medication side effects: none    Diet: diabetic      Problem list and histories reviewed & adjusted, as indicated.  Additional history: as documented    Patient Active Problem List   Diagnosis     Essential hypertension, benign     Disorder of bone and cartilage     Neurogenic bladder     Hyperlipidemia     Diabetes mellitus type II, controlled, with no complications (H)     ACP (advance care planning)     Lumbar disc herniation with radiculopathy     Health Care Home     Past Surgical History:   Procedure Laterality Date     C APPENDECTOMY  1996     C VAGINAL HYSTERECTOMY  2007    Hysterectomy, Vaginal     CYSTOSCOPY  2006    urethral dilation     HC COLONOSCOPY THRU STOMA, DIAGNOSTIC  5/2002    Normal     HC DILATION/CURETTAGE DIAG/THER NON OB  10/2005    Dr Fisher     SIGMOIDOSCOPY,DIAGNOSTIC  1997       Social History   Substance Use Topics     Smoking status: Never Smoker     Smokeless tobacco: Never Used     Alcohol use No     Family History   Problem Relation Age of Onset     DIABETES Brother      DIABETES Sister      CANCER Father      lung -  "     Hypertension Mother      OSTEOPOROSIS Mother      bilateral hip fractures, in her 70's     Breast Cancer Sister      CANCER Sister      leukemia         Current Outpatient Prescriptions   Medication Sig Dispense Refill     metFORMIN (GLUCOPHAGE-XR) 500 MG 24 hr tablet Take 2 tablets (1,000 mg) by mouth daily (with dinner) 180 tablet 1     rosuvastatin (CRESTOR) 5 MG tablet Take 1 tablet (5 mg) by mouth daily 90 tablet 3     lisinopril (PRINIVIL/ZESTRIL) 5 MG tablet Take 1 tablet (5 mg) by mouth daily 90 tablet 1     Cyanocobalamin (B-12) 1000 MCG CAPS        glucose blood VI test strips (ONE TOUCH ULTRA TEST) strip 1 Box by In Vitro route daily. 100 strip prn     fish oil-omega-3 fatty acids (FISH OIL) 1000 MG capsule Take 2 g by mouth daily.       ASPIRIN 81 MG OR TABS TWO DAILY 100 3         Reviewed and updated as needed this visit by clinical staff     Reviewed and updated as needed this visit by Provider  Had an episode where she felt faint, wanted to lay down  thanksgiving         ROS:  C: NEGATIVE for fever, chills, change in weight  E/M: NEGATIVE for ear, mouth and throat problems  R: NEGATIVE for significant cough or SOB  CV: NEGATIVE for chest pain, palpitations or peripheral edema  Intermittent cath- less UTI's - changing catheter more often  MS:completed PT- hip pain resolved    OBJECTIVE:     /62 (BP Location: Left arm, Patient Position: Chair, Cuff Size: Adult Regular)  Pulse 86  Temp 97.6  F (36.4  C) (Oral)  Ht 1.575 m (5' 2\")  Wt 62.7 kg (138 lb 3.2 oz)  SpO2 95%  Breastfeeding? No  BMI 25.28 kg/m2  Body mass index is 25.28 kg/(m^2).     118/58  Regular rate and  rhythm. S1 and S2 normal, no murmurs, clicks, gallops or rubs. No edema or JVD. Chest is clear; no wheezes or rales.        Diabetic foot exam:  Dorsalis pedis pulse R:4  Dorsalis pedis pulse L: 4  Skin temperature R:normal  Skin temperature L:normal  Capillary refill R:normal  Capillary refill L:normal  Hair growth " R:normal  Hair growth L:normal  Nail growth R/L:normal  Monofilament R foot:normal monofilament exam, except for findings noted on diabetic foot graphical image.  Monofilament L foot:normal monofilament exam, except for findings noted on diabetic foot graphical image      Diagnostic Test Results:  none     ASSESSMENT/PLAN:     Problem List Items Addressed This Visit     Diabetes mellitus type II, controlled, with no complications (H) - Primary    Relevant Orders    Hemoglobin A1c (BFP) (Completed)    VENOUS COLLECTION (Completed)      Other Visit Diagnoses     Need for vaccination        Relevant Orders    PNEUMOCOCCAL CONJ VACCINE 13 VALENT IM (Completed)    VACCINE ADMINISTRATION, INITIAL (Completed)         MEDICATIONS:  Continue current medications without change  CONSULTATION/REFERRAL to  [facility]- unable to cut her nails  FUTURE APPOINTMENTS:       - Follow-up visit in 6 months    Loraine Garvey MD  [facility]

## 2017-11-30 NOTE — MR AVS SNAPSHOT
"              After Visit Summary   11/30/2017    Amina Matute    MRN: [id number]           Patient Information     Date Of Birth          1935        Visit Information        Provider Department      11/30/2017 9:30 AM Loraine Garvey MD [facility]        Today's Diagnoses     Controlled type 2 diabetes mellitus without complication, without long-term current use of insulin (H)    -  1    Essential hypertension, benign        Neurogenic bladder        Need for vaccination           Follow-ups after your visit        Who to contact     If you have questions or need follow up information about today's clinic visit or your schedule please contact [facility] directly at [phone].  Normal or non-critical lab and imaging results will be communicated to you by MyChart, letter or phone within 4 business days after the clinic has received the results. If you do not hear from us within 7 days, please contact the clinic through MyChart or phone. If you have a critical or abnormal lab result, we will notify you by phone as soon as possible.  Submit refill requests through Pogoseat or call your pharmacy and they will forward the refill request to us. Please allow 3 business days for your refill to be completed.          Additional Information About Your Visit        Care EveryWhere ID     This is your Care EveryWhere ID. This could be used by other organizations to access your [place] medical records  [id number]        Your Vitals Were     Pulse Temperature Height Pulse Oximetry Breastfeeding? BMI (Body Mass Index)    86 97.6  F (36.4  C) (Oral) 1.575 m (5' 2\") 95% No 25.28 kg/m2       Blood Pressure from Last 3 Encounters:   11/30/17 114/62   10/05/17 142/62   07/14/17 134/70    Weight from Last 3 Encounters:   11/30/17 62.7 kg (138 lb 3.2 oz)   10/05/17 63 kg (139 lb)   07/14/17 63 kg (139 lb)              We Performed the Following     ALT (QUEST)     BASIC " METABOLIC PANEL (QUEST)     Hemoglobin A1c (BFP)     Lipid Profile     PNEUMOCOCCAL CONJ VACCINE 13 VALENT IM     VACCINE ADMINISTRATION, INITIAL     VENOUS COLLECTION          Today's Medication Changes          These changes are accurate as of: 11/30/17 11:59 PM.  If you have any questions, ask your nurse or doctor.               These medicines have changed or have updated prescriptions.        Dose/Directions    metFORMIN 500 MG 24 hr tablet   Commonly known as:  GLUCOPHAGE-XR   This may have changed:    - how much to take  - when to take this   Used for:  Controlled type 2 diabetes mellitus without complication, without long-term current use of insulin (H)   Changed by:  Loraine Garvey MD        Dose:  1000 mg   Take 2 tablets (1,000 mg) by mouth daily (with dinner)   Quantity:  180 tablet   Refills:  1            Where to get your medicines      These medications were sent to [facility] [address] [place] [address]     Phone:  [phone]     metFORMIN 500 MG 24 hr tablet                Primary Care Provider Office Phone # Fax #    Loraine Garvey [phone] [address] [address]        Equal Access to Services     [facility] AH: Hadii aad ku hadasho Soomaali, waaxda luqadaha, qaybta kaalmada adeegyada, waxay elvirain haycharlyn angel riley. So [facility] [phone].    ATENCIÓN: Si habla español, tiene a carreno disposición servicios gratuitos de asistencia lingüística. Isaak al [phone].    We comply with applicable federal civil rights laws and [place] laws. We do not discriminate on the basis of race, color, national origin, age, disability, sex, sexual orientation, or gender identity.            Thank you!     Thank you for choosing [facility]  for your care. Our goal is always to provide you with excellent care. Hearing back from our patients is one way  we can continue to improve our services. Please take a few minutes to complete the written survey that you may receive in the mail after your visit with us. Thank you!             Your Updated Medication List - Protect others around you: Learn how to safely use, store and throw away your medicines at www.disposemymeds.org.          This list is accurate as of: 11/30/17 11:59 PM.  Always use your most recent med list.                   Brand Name Dispense Instructions for use Diagnosis    aspirin 81 MG tablet     100    TWO DAILY    Essential hypertension, benign, Other and unspecified hyperlipidemia       B-12 1000 MCG Caps           blood glucose monitoring test strip    ONETOUCH ULTRA    100 strip    1 Box by In Vitro route daily.    Type II or unspecified type diabetes mellitus without mention of complication, not stated as uncontrolled       fish oil-omega-3 fatty acids 1000 MG capsule      Take 2 g by mouth daily.    Other and unspecified hyperlipidemia       lisinopril 5 MG tablet    PRINIVIL/ZESTRIL    90 tablet    Take 1 tablet (5 mg) by mouth daily    Controlled type 2 diabetes mellitus without complication, without long-term current use of insulin (H), Essential hypertension, benign       metFORMIN 500 MG 24 hr tablet    GLUCOPHAGE-XR    180 tablet    Take 2 tablets (1,000 mg) by mouth daily (with dinner)    Controlled type 2 diabetes mellitus without complication, without long-term current use of insulin (H)       rosuvastatin 5 MG tablet    CRESTOR    90 tablet    Take 1 tablet (5 mg) by mouth daily    Controlled type 2 diabetes mellitus without complication, without long-term current use of insulin (H)

## 2017-11-30 NOTE — NURSING NOTE
Amina is here for med check    Pre-Visit Screening :  Immunizations : Prevnar 13 today    Colonoscopy : is up to date-No longer needed  Mammogram : is up to date-no longer needed  Asthma Action Test/Plan : THA  PHQ9/GAD7 :  NA  Pulse - regular  My Chart - accepts    CLASSIFICATION OF OVERWEIGHT AND OBESITY BY BMI                         Obesity Class           BMI(kg/m2)  Underweight                                    < 18.5  Normal                                         18.5-24.9  Overweight                                     25.0-29.9  OBESITY                     I                  30.0-34.9                              II                 35.0-39.9  EXTREME OBESITY             III                >40                             Patient's  BMI Body mass index is 25.28 kg/(m^2).  http://hin.nhlbi.nih.gov/menuplanner/menu.cgi  Questioned patient about current smoking habits.  Pt. has never smoked.  The patient has verbalized that it is ok to leave a detailed voice message on the patient's home phone [phone] with results/recommendations from this visit.

## 2017-12-01 LAB
ALT SERPL-CCNC: 11 U/L (ref 6–29)
BUN SERPL-MCNC: 21 MG/DL (ref 7–25)
BUN/CREATININE RATIO: 23 (CALC) (ref 6–22)
CALCIUM SERPL-MCNC: 9.8 MG/DL (ref 8.6–10.4)
CHLORIDE SERPLBLD-SCNC: 101 MMOL/L (ref 98–110)
CHOLEST SERPL-MCNC: 190 MG/DL
CHOLEST/HDLC SERPL: 2.8 (CALC)
CO2 SERPL-SCNC: 24 MMOL/L (ref 20–31)
CREAT SERPL-MCNC: 0.93 MG/DL (ref 0.6–0.88)
EGFR AFRICAN AMERICAN - QUEST: 67 ML/MIN/1.73M2
GFR SERPL CREATININE-BSD FRML MDRD: 58 ML/MIN/1.73M2
GLUCOSE - QUEST: 106 MG/DL (ref 65–99)
HDLC SERPL-MCNC: 69 MG/DL
LDLC SERPL CALC-MCNC: 99 MG/DL (CALC)
NONHDLC SERPL-MCNC: 121 MG/DL (CALC)
POTASSIUM SERPL-SCNC: 4.6 MMOL/L (ref 3.5–5.3)
SODIUM SERPL-SCNC: 138 MMOL/L (ref 135–146)
TRIGL SERPL-MCNC: 119 MG/DL

## 2018-04-07 DIAGNOSIS — I10 ESSENTIAL HYPERTENSION, BENIGN: ICD-10-CM

## 2018-04-07 DIAGNOSIS — E11.9 CONTROLLED TYPE 2 DIABETES MELLITUS WITHOUT COMPLICATION, WITHOUT LONG-TERM CURRENT USE OF INSULIN (H): ICD-10-CM

## 2018-04-07 RX ORDER — LISINOPRIL 5 MG/1
5 TABLET ORAL DAILY
Qty: 90 TABLET | Refills: 0 | Status: SHIPPED | OUTPATIENT
Start: 2018-04-07 | End: 2018-04-20

## 2018-04-07 NOTE — TELEPHONE ENCOUNTER
Amina is requesting a refill of the following.    Pending Prescriptions:                       Disp   Refills    lisinopril (PRINIVIL/ZESTRIL) 5 MG tablet 90 tab*0            Sig: Take 1 tablet (5 mg) by mouth daily    Pt was last seen 11-30-17 and has been in [place]. Pt is currently there and needing a refill. Will be back to [place] in April- per last ov notes.  Please advise

## 2018-04-20 ENCOUNTER — OFFICE VISIT (OUTPATIENT)
Dept: FAMILY MEDICINE | Facility: CLINIC | Age: 83
End: 2018-04-20

## 2018-04-20 VITALS
DIASTOLIC BLOOD PRESSURE: 72 MMHG | HEART RATE: 80 BPM | TEMPERATURE: 97.8 F | BODY MASS INDEX: 24.51 KG/M2 | OXYGEN SATURATION: 99 % | SYSTOLIC BLOOD PRESSURE: 132 MMHG | WEIGHT: 134 LBS

## 2018-04-20 DIAGNOSIS — N39.0 RECURRENT UTI: ICD-10-CM

## 2018-04-20 DIAGNOSIS — E11.9 CONTROLLED TYPE 2 DIABETES MELLITUS WITHOUT COMPLICATION, WITHOUT LONG-TERM CURRENT USE OF INSULIN (H): Primary | ICD-10-CM

## 2018-04-20 DIAGNOSIS — I10 ESSENTIAL HYPERTENSION, BENIGN: ICD-10-CM

## 2018-04-20 LAB
ALBUMIN (URINE): ABNORMAL MG/DL
ALBUMIN URINE MG/G CR: ABNORMAL MG/G CREATININE
ALBUMIN URINE MG/SPEC: 80
APPEARANCE UR: ABNORMAL
BACTERIA, UR: ABNORMAL
BILIRUB UR QL: ABNORMAL
CASTS/LPF: ABNORMAL
COLOR UR: YELLOW
CREATININE URINE: 100
EP/HPF: ABNORMAL
GLUCOSE URINE: ABNORMAL MG/DL
HBA1C MFR BLD: 6.1 % (ref 4–7)
HEMOGLOBIN: 11.8 G/DL (ref 11.7–15.7)
HGB UR QL: ABNORMAL
KETONES UR QL: ABNORMAL MG/DL
LEUKOCYTE ESTERASE - QUEST: ABNORMAL
MISC.: ABNORMAL
NITRITE UR QL STRIP: ABNORMAL
PH UR STRIP: 5.5 PH (ref 5–7)
RBC, UR MICRO: ABNORMAL (ref ?–2)
SP. GRAVITY: 1.01
UROBILINOGEN UR QL STRIP: 0.2 EU/DL (ref 0.2–1)
WBC, UR MICRO: ABNORMAL (ref ?–2)

## 2018-04-20 PROCEDURE — 81001 URINALYSIS AUTO W/SCOPE: CPT | Performed by: FAMILY MEDICINE

## 2018-04-20 PROCEDURE — 36415 COLL VENOUS BLD VENIPUNCTURE: CPT | Performed by: FAMILY MEDICINE

## 2018-04-20 PROCEDURE — 82043 UR ALBUMIN QUANTITATIVE: CPT | Performed by: FAMILY MEDICINE

## 2018-04-20 PROCEDURE — 85018 HEMOGLOBIN: CPT | Performed by: FAMILY MEDICINE

## 2018-04-20 PROCEDURE — 83036 HEMOGLOBIN GLYCOSYLATED A1C: CPT | Performed by: FAMILY MEDICINE

## 2018-04-20 PROCEDURE — 80048 BASIC METABOLIC PNL TOTAL CA: CPT | Mod: 90 | Performed by: FAMILY MEDICINE

## 2018-04-20 PROCEDURE — 99214 OFFICE O/P EST MOD 30 MIN: CPT | Performed by: FAMILY MEDICINE

## 2018-04-20 RX ORDER — METFORMIN HCL 500 MG
1000 TABLET, EXTENDED RELEASE 24 HR ORAL
Qty: 180 TABLET | Refills: 1 | Status: SHIPPED | OUTPATIENT
Start: 2018-04-20 | End: 2018-11-09

## 2018-04-20 RX ORDER — LISINOPRIL 5 MG/1
5 TABLET ORAL DAILY
Qty: 90 TABLET | Refills: 1 | Status: SHIPPED | OUTPATIENT
Start: 2018-04-20 | End: 2018-10-22

## 2018-04-20 NOTE — MR AVS SNAPSHOT
"              After Visit Summary   4/20/2018    Amina Matute    MRN: [id number]           Patient Information     Date Of Birth          1935        Visit Information        Provider Department      4/20/2018 9:45 AM Loraine Garvey MD [facility]        Today's Diagnoses     Recurrent UTI    -  1    Controlled type 2 diabetes mellitus without complication, without long-term current use of insulin (H)        Essential hypertension, benign          Care Instructions    New Recombinant shingles vaccine (Shingrix): call your insurance for information on cost      Recheck first week in November              Follow-ups after your visit        Who to contact     If you have questions or need follow up information about today's clinic visit or your schedule please contact [facility] directly at [phone].  Normal or non-critical lab and imaging results will be communicated to you by MyChart, letter or phone within 4 business days after the clinic has received the results. If you do not hear from us within 7 days, please contact the clinic through MyChart or phone. If you have a critical or abnormal lab result, we will notify you by phone as soon as possible.  Submit refill requests through [employer] or call your pharmacy and they will forward the refill request to us. Please allow 3 business days for your refill to be completed.          Additional Information About Your Visit        MyChart Information     [employer] lets you send messages to your doctor, view your test results, renew your prescriptions, schedule appointments and more. To sign up, go to [email or web address] . Click on \"Log in\" on the left side of the screen, which will take you to the Welcome page. Then click on \"Sign up Now\" on the right side of the page.     You will be asked to enter the access code listed below, as well as some personal information. Please follow the directions to create " your username and password.     Your access code is: [email or web address]  Expires: 2018 10:03 AM     Your access code will  in 90 days. If you need help or a new code, please call your [place] clinic or [phone].        Care EveryWhere ID     This is your Care EveryWhere ID. This could be used by other organizations to access your [place] medical records  [id number]        Your Vitals Were     Pulse Temperature Pulse Oximetry BMI (Body Mass Index)          80 97.8  F (36.6  C) (Oral) 99% 24.51 kg/m2         Blood Pressure from Last 3 Encounters:   18 132/72   17 114/62   10/05/17 142/62    Weight from Last 3 Encounters:   18 60.8 kg (134 lb)   17 62.7 kg (138 lb 3.2 oz)   10/05/17 63 kg (139 lb)              We Performed the Following     Albumin Random Urine Quantitative with Creat Ratio     BASIC METABOLIC PANEL (QUEST)     Hemoglobin A1c (BFP)     Urine Macroscopic Only (BFP)     VENOUS COLLECTION          Where to get your medicines      These medications were sent to [facility] [address] [place] [address]     Phone:  [phone]     lisinopril 5 MG tablet    metFORMIN 500 MG 24 hr tablet          Primary Care Provider Office Phone # Fax #    Loraine Garvey [phone] [address] [address]        Equal Access to Services     MORRIS MARCANO : Hadii carlene menjivaro Sodonn, waaxda luqadaha, qaybta kaalmada adeegyaevelio, caryn reyna adeagustin riley. So [facility] [phone].    ATENCIÓN: Si habla español, tiene a carreno disposición servicios gratuitos de asistencia lingüística. Llame al [phone].    We comply with applicable federal civil rights laws and [place] laws. We do not discriminate on the basis of race, color, national origin, age, disability, sex, sexual orientation, or gender identity.            Thank you!     Thank you for choosing  [facility]  for your care. Our goal is always to provide you with excellent care. Hearing back from our patients is one way we can continue to improve our services. Please take a few minutes to complete the written survey that you may receive in the mail after your visit with us. Thank you!             Your Updated Medication List - Protect others around you: Learn how to safely use, store and throw away your medicines at www.disposemymeds.org.          This list is accurate as of 4/20/18 10:03 AM.  Always use your most recent med list.                   Brand Name Dispense Instructions for use Diagnosis    aspirin 81 MG tablet     100    TWO DAILY    Essential hypertension, benign, Other and unspecified hyperlipidemia       B-12 1000 MCG Caps           blood glucose monitoring test strip    ONETOUCH ULTRA    100 strip    1 Box by In Vitro route daily.    Type II or unspecified type diabetes mellitus without mention of complication, not stated as uncontrolled       fish oil-omega-3 fatty acids 1000 MG capsule      Take 2 g by mouth daily.    Other and unspecified hyperlipidemia       lisinopril 5 MG tablet    PRINIVIL/ZESTRIL    90 tablet    Take 1 tablet (5 mg) by mouth daily    Controlled type 2 diabetes mellitus without complication, without long-term current use of insulin (H), Essential hypertension, benign       metFORMIN 500 MG 24 hr tablet    GLUCOPHAGE-XR    180 tablet    Take 2 tablets (1,000 mg) by mouth daily (with dinner)    Controlled type 2 diabetes mellitus without complication, without long-term current use of insulin (H)       rosuvastatin 5 MG tablet    CRESTOR    90 tablet    Take 1 tablet (5 mg) by mouth daily    Controlled type 2 diabetes mellitus without complication, without long-term current use of insulin (H)

## 2018-04-20 NOTE — NURSING NOTE
Amina is here for medication check.     Pre-visit Screening:  Immunizations:  up to date  Colonoscopy:  is up to date  Mammogram: is up to date  Asthma Action Test/Plan:  No concerns  PHQ9:  NA  GAD7:  NA  Questioned patient about current smoking habits Pt. has never smoked.  Ok to leave detailed message on voice mail for today's visit only Yes, phone # [phone]

## 2018-04-20 NOTE — LETTER
April 23, 2018      Amina Matute  [address]  [facility] [address]        Dear ,    We are writing to inform you of your test results.     Good control of diabetis/ both Hemoglobin A1C and fasting blood sugar is at goal    urinalysis does indicate a bladder infection as we discussed: monitor your symptoms- be generous with your fluid intake.    Normal kidney function tests and potassium.    Resulted Orders   Hemoglobin A1c (BFP)   Result Value Ref Range    Hemoglobin A1C 6.1 4.0 - 7.0 %   Albumin Random Urine Quantitative with Creat Ratio   Result Value Ref Range    Albumin Urine mg/spec 80 (A) <30    Albumin Urine mg/g Cr  (A) <30 MG/G Creatinine    Creatinine Urine 100 <300   BASIC METABOLIC PANEL (QUEST)   Result Value Ref Range    Glucose 112 (H) 65 - 99 mg/dL      Comment:                    Fasting reference interval     For someone without known diabetes, a glucose value  between 100 and 125 mg/dL is consistent with  prediabetes and should be confirmed with a  follow-up test.         Urea Nitrogen 15 7 - 25 mg/dL    Creatinine 0.67 0.60 - 0.88 mg/dL      Comment:      For patients >49 years of age, the reference limit  for Creatinine is approximately 13% higher for people  identified as -American.         GFR Estimate 82 > OR = 60 mL/min/1.73m2    EGFR African American 96 > OR = 60 mL/min/1.73m2    BUN/Creatinine Ratio NOT APPLICABLE 6 - 22 (calc)    Sodium 139 135 - 146 mmol/L    Potassium 4.4 3.5 - 5.3 mmol/L    Chloride 105 98 - 110 mmol/L    Carbon Dioxide 22 20 - 31 mmol/L    Calcium 9.4 8.6 - 10.4 mg/dL   CL AFF HEMOGLOBIN (BFP)   Result Value Ref Range    Hemoglobin 11.8 11.7 - 15.7 g/dL   HCL  Urinalysis, Routine (BFP)   Result Value Ref Range    Color Urine Yellow     Appearance Urine Cloudy (A)     Glucose Urine Neg neg mg/dL    Bilirubin Urine Neg neg    Ketones Urine Neg neg mg/dL    Specific Gravity 1.010     Blood Urine Trace (A) neg    pH Urine 5.5 5.0 - 7.0  pH    Albumin Urine neg neg - neg mg/dL    Urobilinogen Urine 0.2 0.2 - 1.0 EU/dL    Nitrite Urine Pos (A) NEG    Leukocyte Esterase mod (A) neg - neg    Wbc, Urine Micro 40-50 (A) neg - 2    RBC Micro Urine 3-5 (A) neg - 2    EP/HPF few     Bacteria Urine large (A) neg - neg    Casts/LPF neg     Miscellaneous neg        If you have any questions or concerns, please call the clinic at the number listed above.       Sincerely,        Loraine Garvey MD

## 2018-04-20 NOTE — PATIENT INSTRUCTIONS
New Recombinant shingles vaccine (Shingrix): call your insurance for information on cost      Recheck first week in November

## 2018-04-20 NOTE — PROGRESS NOTES
"  SUBJECTIVE:   Amina Matute is a 82 year old female who presents to clinic today for the following health issues:    Diabetes Follow-up      Patient is checking blood sugars: not at all    Diabetic concerns: None     Symptoms of hypoglycemia (low blood sugar): none     Paresthesias (numbness or burning in feet) or sores: No     Date of last diabetic eye exam: due- will call    BP Readings from Last 2 Encounters:   04/20/18 132/72   11/30/17 114/62     Hemoglobin A1C (%)   Date Value   11/30/2017 6.2   07/06/2017 6.5     LDL Cholesterol Calculated (mg/dL (calc))   Date Value   11/30/2017 99   07/06/2017 152 (H)       Amount of exercise or physical activity: active lifestyle- neck pain when walks, posterior    Problems taking medications regularly: No    Medication side effects: none    Diet: diabetic    PROBLEMS TO ADD ON...    Self cath: one infection this winter requiring treatment with an antibiotic  She currently denies dysuria, abdominal or flank pain or fever or chills      \"had a sinus infection a couple of days ago\"  Symptoms have improved with Nasal irrigation    Problem list and histories reviewed & adjusted, as indicated.  Additional history: as documented    Patient Active Problem List   Diagnosis     Essential hypertension, benign     Disorder of bone and cartilage     Neurogenic bladder     Hyperlipidemia     Diabetes mellitus type II, controlled, with no complications (H)     ACP (advance care planning)     Lumbar disc herniation with radiculopathy     Health Care Home     Past Surgical History:   Procedure Laterality Date     C APPENDECTOMY  1996     C VAGINAL HYSTERECTOMY  2007    Hysterectomy, Vaginal     CYSTOSCOPY  2006    urethral dilation     HC COLONOSCOPY THRU STOMA, DIAGNOSTIC  5/2002    Normal     HC DILATION/CURETTAGE DIAG/THER NON OB  10/2005    Dr Fisher     SIGMOIDOSCOPY,DIAGNOSTIC  1997       Social History   Substance Use Topics     Smoking status: Never Smoker     Smokeless " tobacco: Never Used     Alcohol use No     Family History   Problem Relation Age of Onset     DIABETES Brother      DIABETES Sister      CANCER Father      lung -      Hypertension Mother      OSTEOPOROSIS Mother      bilateral hip fractures, in her 70's     Breast Cancer Sister      CANCER Sister      leukemia         Current Outpatient Prescriptions   Medication Sig Dispense Refill     ASPIRIN 81 MG OR TABS TWO DAILY 100 3     Cyanocobalamin (B-12) 1000 MCG CAPS        fish oil-omega-3 fatty acids (FISH OIL) 1000 MG capsule Take 2 g by mouth daily.       glucose blood VI test strips (ONE TOUCH ULTRA TEST) strip 1 Box by In Vitro route daily. 100 strip prn     lisinopril (PRINIVIL/ZESTRIL) 5 MG tablet Take 1 tablet (5 mg) by mouth daily 90 tablet 1     metFORMIN (GLUCOPHAGE-XR) 500 MG 24 hr tablet Take 2 tablets (1,000 mg) by mouth daily (with dinner) 180 tablet 1     rosuvastatin (CRESTOR) 5 MG tablet Take 1 tablet (5 mg) by mouth daily 90 tablet 3       Reviewed and updated as needed this visit by clinical staff  Tobacco  Allergies  Meds  Problems       Reviewed and updated as needed this visit by Provider         ROS:  Constitutional, HEENT, cardiovascular, pulmonary, gi and gu systems are negative, except as otherwise noted.    OBJECTIVE:     /72 (BP Location: Left arm, Patient Position: Sitting, Cuff Size: Adult Regular)  Pulse 80  Temp 97.8  F (36.6  C) (Oral)  Wt 60.8 kg (134 lb)  SpO2 99%  BMI 24.51 kg/m2  Body mass index is 24.51 kg/(m^2).   Regular rate and  rhythm. S1 and S2 normal, no murmurs, clicks, gallops or rubs. No edema or JVD. Chest is clear; no wheezes or rales.    Diabetic foot exam:  Dorsalis pedis pulse R:3  Dorsalis pedis pulse L: 3  Skin temperature R:normal  Skin temperature L:normal  Capillary refill R:normal  Capillary refill L:normal  Hair growth R:normal  Hair growth L:normal  Nail growth R:normal  Monofilament R foot:normal monofilament exam   Monofilament L  foot:normal monofilament exam      (N39.0) Recurrent UTI  (primary encounter diagnosis)  Comment: pos nitrate- leukocytes  Patient is asymptomatic- continue self cath, push fluids and monitor symptoms  Patient called and discussed results  Plan: HCL  Urinalysis, Routine (BFP), CANCELED: Urine        Macroscopic Only (BFP)           (E11.9) Controlled type 2 diabetes mellitus without complication, without long-term current use of insulin (H)  Comment:   Plan: lisinopril (PRINIVIL/ZESTRIL) 5 MG tablet,         Hemoglobin A1c (BFP), VENOUS COLLECTION, BASIC         METABOLIC PANEL (QUEST), metFORMIN         (GLUCOPHAGE-XR) 500 MG 24 hr tablet, CL AFF         HEMOGLOBIN (BFP), CANCELED: Hemoglobin (QUEST)             (I10) Essential hypertension, benign  Comment: controlled  Plan: lisinopril (PRINIVIL/ZESTRIL) 5 MG tablet       Refill current dose  Basic profile        ASSESSMENT/PLAN:     Problem List Items Addressed This Visit     Essential hypertension, benign    Relevant Medications    lisinopril (PRINIVIL/ZESTRIL) 5 MG tablet    Diabetes mellitus type II, controlled, with no complications (H)    Relevant Medications    lisinopril (PRINIVIL/ZESTRIL) 5 MG tablet    metFORMIN (GLUCOPHAGE-XR) 500 MG 24 hr tablet    Other Relevant Orders    Hemoglobin A1c (BFP) (Completed)    VENOUS COLLECTION (Completed)    BASIC METABOLIC PANEL (QUEST)      Other Visit Diagnoses     Recurrent UTI    -  Primary    Relevant Orders    Urine Macroscopic Only (BFP)           MEDICATIONS:  Continue current medications without change  FUTURE APPOINTMENTS:       - Follow-up visit in 6 months  SELF MONITORING:       - monitor urinary symptoms  Regular exercise    Loraine Garvey MD  [facility]

## 2018-04-21 LAB
BUN SERPL-MCNC: 15 MG/DL (ref 7–25)
BUN/CREATININE RATIO: ABNORMAL (CALC) (ref 6–22)
CALCIUM SERPL-MCNC: 9.4 MG/DL (ref 8.6–10.4)
CHLORIDE SERPLBLD-SCNC: 105 MMOL/L (ref 98–110)
CO2 SERPL-SCNC: 22 MMOL/L (ref 20–31)
CREAT SERPL-MCNC: 0.67 MG/DL (ref 0.6–0.88)
EGFR AFRICAN AMERICAN - QUEST: 96 ML/MIN/1.73M2
GFR SERPL CREATININE-BSD FRML MDRD: 82 ML/MIN/1.73M2
GLUCOSE - QUEST: 112 MG/DL (ref 65–99)
POTASSIUM SERPL-SCNC: 4.4 MMOL/L (ref 3.5–5.3)
SODIUM SERPL-SCNC: 139 MMOL/L (ref 135–146)

## 2018-07-18 DIAGNOSIS — E11.9 CONTROLLED TYPE 2 DIABETES MELLITUS WITHOUT COMPLICATION, WITHOUT LONG-TERM CURRENT USE OF INSULIN (H): ICD-10-CM

## 2018-07-18 RX ORDER — ROSUVASTATIN CALCIUM 5 MG/1
TABLET, COATED ORAL
Qty: 90 TABLET | Refills: 1 | Status: SHIPPED | OUTPATIENT
Start: 2018-07-18 | End: 2018-11-09

## 2018-07-18 NOTE — TELEPHONE ENCOUNTER
Pending Prescriptions:                       Disp   Refills    rosuvastatin (CRESTOR) 5 MG tablet [Pharm*90 tab*             Sig: TAKE ONE TABLET BY MOUTH ONCE DAILY    BJS please review:    Pt last refill was 7-3-2017  Last ov was 4-2018 not sure if pt was fasting then/rtc in six months(Oct)  Do you want to see pt again? Refill for 90 days?  Lab only?  Please advise CHANGE QTY or send to [facility]  [phone] (home)

## 2018-09-26 ENCOUNTER — TELEPHONE (OUTPATIENT)
Dept: FAMILY MEDICINE | Facility: CLINIC | Age: 83
End: 2018-09-26

## 2018-09-26 ENCOUNTER — OFFICE VISIT (OUTPATIENT)
Dept: FAMILY MEDICINE | Facility: CLINIC | Age: 83
End: 2018-09-26

## 2018-09-26 VITALS
DIASTOLIC BLOOD PRESSURE: 68 MMHG | OXYGEN SATURATION: 97 % | TEMPERATURE: 98.1 F | WEIGHT: 132.2 LBS | SYSTOLIC BLOOD PRESSURE: 124 MMHG | BODY MASS INDEX: 24.18 KG/M2 | HEART RATE: 79 BPM

## 2018-09-26 DIAGNOSIS — M79.672 HEEL PAIN, CHRONIC, LEFT: Primary | ICD-10-CM

## 2018-09-26 DIAGNOSIS — G89.29 HEEL PAIN, CHRONIC, LEFT: Primary | ICD-10-CM

## 2018-09-26 PROCEDURE — 99213 OFFICE O/P EST LOW 20 MIN: CPT | Performed by: FAMILY MEDICINE

## 2018-09-26 PROCEDURE — 73630 X-RAY EXAM OF FOOT: CPT | Mod: LT | Performed by: FAMILY MEDICINE

## 2018-09-26 NOTE — MR AVS SNAPSHOT
"              After Visit Summary   9/26/2018    Amina Matute    MRN: [id number]           Patient Information     Date Of Birth          1935        Visit Information        Provider Department      9/26/2018 10:45 AM Loraine Garvey MD [facility]        Today's Diagnoses     Heel pain, chronic, left    -  1      Care Instructions    CHANGE SHOES- three different pairs    Do not go barefoot    Ice heel couple times a day    Aleve: 2 pills twice a day with food for five days    Heel stretches     wash cloth with toes    Call me in a week: [phone] with an update          Follow-ups after your visit        Who to contact     If you have questions or need follow up information about today's clinic visit or your schedule please contact [facility] directly at [phone].  Normal or non-critical lab and imaging results will be communicated to you by [employer], letter or phone within 4 business days after the clinic has received the results. If you do not hear from us within 7 days, please contact the clinic through [employer] or phone. If you have a critical or abnormal lab result, we will notify you by phone as soon as possible.  Submit refill requests through [employer] or call your pharmacy and they will forward the refill request to us. Please allow 3 business days for your refill to be completed.          Additional Information About Your Visit        [employer] Information     [employer] lets you send messages to your doctor, view your test results, renew your prescriptions, schedule appointments and more. To sign up, go to [email or web address] . Click on \"Log in\" on the left side of the screen, which will take you to the Welcome page. Then click on \"Sign up Now\" on the right side of the page.     You will be asked to enter the access code listed below, as well as some personal information. Please follow the directions to create your username and " password.     Your access code is: [email or web address]  Expires: 2018 11:56 AM     Your access code will  in 90 days. If you need help or a new code, please call your [place] clinic or [phone].        Care EveryWhere ID     This is your Care EveryWhere ID. This could be used by other organizations to access your [place] medical records  [id number]        Your Vitals Were     Pulse Temperature Pulse Oximetry BMI (Body Mass Index)          79 98.1  F (36.7  C) (Oral) 97% 24.18 kg/m2         Blood Pressure from Last 3 Encounters:   18 124/68   18 132/72   17 114/62    Weight from Last 3 Encounters:   18 60 kg (132 lb 3.2 oz)   18 60.8 kg (134 lb)   17 62.7 kg (138 lb 3.2 oz)              We Performed the Following     X-ray lt Foot G/E 3 vws*        Primary Care Provider Office Phone # Fax #    Loraine Garvey [phone]       [address] [address]        Equal Access to Services     MORRIS MARCANO : Hadii aad ku hadasho Soomaali, waaxda luqadaha, qaybta kaalmada adeegyada, waxay elvirain haycharlyn angel bruno . So [facility] [phone].    ATENCIÓN: Si habla español, tiene a carreno disposición servicios gratuitos de asistencia lingüística. Llame al [phone].    We comply with applicable federal civil rights laws and [place] laws. We do not discriminate on the basis of race, color, national origin, age, disability, sex, sexual orientation, or gender identity.            Thank you!     Thank you for choosing [facility]  for your care. Our goal is always to provide you with excellent care. Hearing back from our patients is one way we can continue to improve our services. Please take a few minutes to complete the written survey that you may receive in the mail after your visit with us. Thank you!             Your Updated Medication List - Protect others around you: Learn how to safely use, store and  throw away your medicines at www.disposemymeds.org.          This list is accurate as of 9/26/18 11:56 AM.  Always use your most recent med list.                   Brand Name Dispense Instructions for use Diagnosis    aspirin 81 MG tablet     100    TWO DAILY    Essential hypertension, benign, Other and unspecified hyperlipidemia       B-12 1000 MCG Caps           blood glucose monitoring test strip    ONETOUCH ULTRA    100 strip    1 Box by In Vitro route daily.    Type II or unspecified type diabetes mellitus without mention of complication, not stated as uncontrolled       fish oil-omega-3 fatty acids 1000 MG capsule      Take 2 g by mouth daily.    Other and unspecified hyperlipidemia       lisinopril 5 MG tablet    PRINIVIL/ZESTRIL    90 tablet    Take 1 tablet (5 mg) by mouth daily    Controlled type 2 diabetes mellitus without complication, without long-term current use of insulin (H), Essential hypertension, benign       metFORMIN 500 MG 24 hr tablet    GLUCOPHAGE-XR    180 tablet    Take 2 tablets (1,000 mg) by mouth daily (with dinner)    Controlled type 2 diabetes mellitus without complication, without long-term current use of insulin (H)       * rosuvastatin 5 MG tablet    CRESTOR    90 tablet    Take 1 tablet (5 mg) by mouth daily    Controlled type 2 diabetes mellitus without complication, without long-term current use of insulin (H)       * rosuvastatin 5 MG tablet    CRESTOR    90 tablet    TAKE ONE TABLET BY MOUTH ONCE DAILY    Controlled type 2 diabetes mellitus without complication, without long-term current use of insulin (H)       * Notice:  This list has 2 medication(s) that are the same as other medications prescribed for you. Read the directions carefully, and ask your doctor or other care provider to review them with you.

## 2018-09-26 NOTE — PROGRESS NOTES
SUBJECTIVE:  83 year old female presents with the following concern:    Left Heel pain for the past week  Walking with a limp-    No known trigger  Had been going barefoot in her home-Started shoes in house  Wears a pair of sketchers that are quite old- she has changed her shoes    Treatment:  Has not taken any ansaids- history of diabetis  She rubs gold lotion on her heel    Patient Active Problem List   Diagnosis     Essential hypertension, benign     Disorder of bone and cartilage     Neurogenic bladder     Hyperlipidemia     Diabetes mellitus type II, controlled, with no complications (H)     ACP (advance care planning)     Lumbar disc herniation with radiculopathy     Health Care Home     Past Surgical History:   Procedure Laterality Date     C APPENDECTOMY  1996     C VAGINAL HYSTERECTOMY  2007    Hysterectomy, Vaginal     CYSTOSCOPY  2006    urethral dilation     HC COLONOSCOPY THRU STOMA, DIAGNOSTIC  5/2002    Normal     HC DILATION/CURETTAGE DIAG/THER NON OB  10/2005    Dr Fisher     SIGMOIDOSCOPY,DIAGNOSTIC  1997     Current Outpatient Prescriptions   Medication     ASPIRIN 81 MG OR TABS     Cyanocobalamin (B-12) 1000 MCG CAPS     fish oil-omega-3 fatty acids (FISH OIL) 1000 MG capsule     glucose blood VI test strips (ONE TOUCH ULTRA TEST) strip     lisinopril (PRINIVIL/ZESTRIL) 5 MG tablet     metFORMIN (GLUCOPHAGE-XR) 500 MG 24 hr tablet     rosuvastatin (CRESTOR) 5 MG tablet     rosuvastatin (CRESTOR) 5 MG tablet     No current facility-administered medications for this visit.      OBJECTIVE:  /68 (BP Location: Left arm, Patient Position: Sitting, Cuff Size: Adult Regular)  Pulse 79  Temp 98.1  F (36.7  C) (Oral)  Wt 60 kg (132 lb 3.2 oz)  SpO2 97%  BMI 24.18 kg/m2  Moderate distress- has difficulty walking- she does not report that pain eases after the first few steps , classic for plantar fasciitis  Point tenderness plantar surface of heel-  No tenderness MP joints  Normal ROM of ankle  without pain  Normal motor exam    Foot x-ray- heel spur    Assessment   Painful heel spur vs plantar fasciitis    PLAN:  Icing, five days of ansaids then dc  No walking barefoot  Frequent changes of shoes  Calf stretches  Call back with progress in a week

## 2018-09-26 NOTE — NURSING NOTE
Amina is here today for left heel.      Pre-visit Screening:  Immunizations:  up to date  Colonoscopy:  is up to date  Mammogram: is up to date  Asthma Action Test/Plan:  THA  PHQ9:  NA  GAD7:  NA  Questioned patient about current smoking habits Pt. has never smoked.  Ok to leave detailed message on voice mail for today's visit only Yes, phone # [phone]

## 2018-09-26 NOTE — TELEPHONE ENCOUNTER
Call patient  Radiologist confirms she has a heel spur on x-ray-  I want her to try icing and antiinflammatories and monitor her symptoms.  Call me in a week with progress

## 2018-09-26 NOTE — PATIENT INSTRUCTIONS
CHANGE SHOES- three different pairs    Do not go barefoot    Ice heel couple times a day    Aleve: 2 pills twice a day with food for five days    Heel stretches     wash cloth with toes    Call me in a week: [phone] with an update

## 2018-10-01 ENCOUNTER — TELEPHONE (OUTPATIENT)
Dept: FAMILY MEDICINE | Facility: CLINIC | Age: 83
End: 2018-10-01

## 2018-10-01 NOTE — TELEPHONE ENCOUNTER
Spoke to patient and she states that her heel is doing much better. She took Aleve this morning for the last time so she is completely good on it now. Routing back to Dr. Garvey for review.

## 2018-10-09 NOTE — TELEPHONE ENCOUNTER
Dr. Garvey     Patient called RE her heal pain. States that it is not better. Would like to see a foot doctor / specialist. I did give her TCO - [phone].     Patient will call back with her appt info.     DEAN

## 2018-10-16 ENCOUNTER — TRANSFERRED RECORDS (OUTPATIENT)
Dept: FAMILY MEDICINE | Facility: CLINIC | Age: 83
End: 2018-10-16

## 2018-10-21 DIAGNOSIS — E11.9 CONTROLLED TYPE 2 DIABETES MELLITUS WITHOUT COMPLICATION, WITHOUT LONG-TERM CURRENT USE OF INSULIN (H): ICD-10-CM

## 2018-10-21 DIAGNOSIS — N39.0 URINARY TRACT INFECTION ASSOCIATED WITH CATHETERIZATION OF URINARY TRACT, UNSPECIFIED INDWELLING URINARY CATHETER TYPE, INITIAL ENCOUNTER (H): Primary | ICD-10-CM

## 2018-10-21 DIAGNOSIS — T83.511A URINARY TRACT INFECTION ASSOCIATED WITH CATHETERIZATION OF URINARY TRACT, UNSPECIFIED INDWELLING URINARY CATHETER TYPE, INITIAL ENCOUNTER (H): Primary | ICD-10-CM

## 2018-10-21 DIAGNOSIS — I10 ESSENTIAL HYPERTENSION, BENIGN: ICD-10-CM

## 2018-10-22 RX ORDER — LISINOPRIL 5 MG/1
TABLET ORAL
Qty: 90 TABLET | Refills: 1 | OUTPATIENT
Start: 2018-10-22

## 2018-10-22 RX ORDER — LISINOPRIL 5 MG/1
5 TABLET ORAL DAILY
Qty: 30 TABLET | Refills: 0 | COMMUNITY
Start: 2018-10-22 | End: 2018-11-09

## 2018-11-09 ENCOUNTER — TELEPHONE (OUTPATIENT)
Dept: FAMILY MEDICINE | Facility: CLINIC | Age: 83
End: 2018-11-09

## 2018-11-09 ENCOUNTER — OFFICE VISIT (OUTPATIENT)
Dept: FAMILY MEDICINE | Facility: CLINIC | Age: 83
End: 2018-11-09

## 2018-11-09 VITALS
OXYGEN SATURATION: 99 % | SYSTOLIC BLOOD PRESSURE: 130 MMHG | BODY MASS INDEX: 24.33 KG/M2 | WEIGHT: 133 LBS | TEMPERATURE: 98.2 F | DIASTOLIC BLOOD PRESSURE: 74 MMHG | HEART RATE: 74 BPM

## 2018-11-09 DIAGNOSIS — I10 ESSENTIAL HYPERTENSION, BENIGN: ICD-10-CM

## 2018-11-09 DIAGNOSIS — S39.011A STRAIN OF ABDOMINAL MUSCLE, INITIAL ENCOUNTER: ICD-10-CM

## 2018-11-09 DIAGNOSIS — J06.9 VIRAL UPPER RESPIRATORY TRACT INFECTION: ICD-10-CM

## 2018-11-09 DIAGNOSIS — N31.9 NEUROGENIC BLADDER: ICD-10-CM

## 2018-11-09 DIAGNOSIS — R82.90 ABNORMAL URINE: Primary | ICD-10-CM

## 2018-11-09 DIAGNOSIS — E11.9 CONTROLLED TYPE 2 DIABETES MELLITUS WITHOUT COMPLICATION, WITHOUT LONG-TERM CURRENT USE OF INSULIN (H): Primary | ICD-10-CM

## 2018-11-09 LAB
ALBUMIN (URINE): ABNORMAL MG/DL
APPEARANCE UR: ABNORMAL
BACTERIA, UR: ABNORMAL
BILIRUB UR QL: ABNORMAL
CASTS/LPF: ABNORMAL
COLOR UR: YELLOW
EP/HPF: ABNORMAL
GLUCOSE SERPL-MCNC: 111 MG/DL (ref 60–99)
GLUCOSE URINE: ABNORMAL MG/DL
HBA1C MFR BLD: 6 % (ref 4–7)
HGB UR QL: ABNORMAL
KETONES UR QL: ABNORMAL MG/DL
LEUKOCYTE ESTERASE - QUEST: ABNORMAL
MISC.: ABNORMAL
NITRITE UR QL STRIP: ABNORMAL
PH UR STRIP: 7 PH (ref 5–7)
RBC, UR MICRO: ABNORMAL (ref ?–2)
SP. GRAVITY: 1.01
UROBILINOGEN UR QL STRIP: 0.2 EU/DL (ref 0.2–1)
WBC, UR MICRO: ABNORMAL (ref ?–2)

## 2018-11-09 PROCEDURE — 83036 HEMOGLOBIN GLYCOSYLATED A1C: CPT | Performed by: FAMILY MEDICINE

## 2018-11-09 PROCEDURE — 80061 LIPID PANEL: CPT | Mod: 90 | Performed by: FAMILY MEDICINE

## 2018-11-09 PROCEDURE — 84460 ALANINE AMINO (ALT) (SGPT): CPT | Mod: 90 | Performed by: FAMILY MEDICINE

## 2018-11-09 PROCEDURE — 36415 COLL VENOUS BLD VENIPUNCTURE: CPT | Performed by: FAMILY MEDICINE

## 2018-11-09 PROCEDURE — 84443 ASSAY THYROID STIM HORMONE: CPT | Mod: 90 | Performed by: FAMILY MEDICINE

## 2018-11-09 PROCEDURE — 81001 URINALYSIS AUTO W/SCOPE: CPT | Performed by: FAMILY MEDICINE

## 2018-11-09 PROCEDURE — 82947 ASSAY GLUCOSE BLOOD QUANT: CPT | Performed by: FAMILY MEDICINE

## 2018-11-09 PROCEDURE — 99214 OFFICE O/P EST MOD 30 MIN: CPT | Performed by: FAMILY MEDICINE

## 2018-11-09 RX ORDER — METFORMIN HCL 500 MG
1000 TABLET, EXTENDED RELEASE 24 HR ORAL
Qty: 180 TABLET | Refills: 1 | Status: SHIPPED | OUTPATIENT
Start: 2018-11-09 | End: 2019-06-21

## 2018-11-09 RX ORDER — LISINOPRIL 5 MG/1
5 TABLET ORAL DAILY
Qty: 90 TABLET | Refills: 1 | Status: SHIPPED | OUTPATIENT
Start: 2018-11-09 | End: 2019-06-21

## 2018-11-09 NOTE — TELEPHONE ENCOUNTER
Per KAILASH, wants to do a UC on Pt. Urine too lod to culture.  LMOM for her to call us to see if she can leave another sample so we can culture.

## 2018-11-09 NOTE — LETTER
November 12, 2018      Amina Matute  [address]  [facility] [address]        Dear ,    We are writing to inform you of your test results.    Hemoglobin A1C and blood sugar shows good control of your blood sugars  Lipid profile: Normal- Rosuvastatin was refilled at your current dose for one year.  Normal thyroid function  Normal liver function test (ALT)  Urinalysis is abnormal- please return to the office for a urine culture at your convenience (I apologize for this not being ordered during your recent visit)  Drink plenty of fluids-     Resulted Orders   Hemoglobin A1c (BFP)   Result Value Ref Range    Hemoglobin A1C 6.0 4.0 - 7.0 %   Lipid Profile   Result Value Ref Range    Cholesterol 177 <200 mg/dL    HDL Cholesterol 78 >50 mg/dL    Triglycerides 113 <150 mg/dL    LDL Cholesterol Calculated 79 mg/dL (calc)      Comment:      Reference range: <100     Desirable range <100 mg/dL for primary prevention;    <70 mg/dL for patients with CHD or diabetic patients   with > or = 2 CHD risk factors.     LDL-C is now calculated using the Peggy   calculation, which is a validated novel method providing   better accuracy than the Friedewald equation in the   estimation of LDL-C.   Herberth SS et al. MARY BETH. 2013;310(19): 8344-9441   (http://education.[employer]/faq/IUK066)      Cholesterol/HDL Ratio 2.3 <5.0 (calc)    Non HDL Cholesterol 99 <130 mg/dL (calc)      Comment:      For patients with diabetes plus 1 major ASCVD risk   factor, treating to a non-HDL-C goal of <100 mg/dL   (LDL-C of <70 mg/dL) is considered a therapeutic   option.     ALT (QUEST)   Result Value Ref Range    ALT 14 6 - 29 U/L   TSH with free T4 reflex (QUEST)   Result Value Ref Range    TSH 1.71 0.40 - 4.50 mIU/L   Glucose Fasting (BFP)   Result Value Ref Range    Glucose 111 (A) 60 - 99 mg/dL   HCL  Urinalysis, Routine (BFP)   Result Value Ref Range    Color Urine Yellow     Appearance Urine Cloudy     Glucose  Urine Neg neg mg/dL    Bilirubin Urine Neg neg    Ketones Urine Neg neg mg/dL    Specific Gravity 1.015     Blood Urine Trace (A) neg    pH Urine 7.0 5.0 - 7.0 pH    Albumin Urine neg neg - neg mg/dL    Urobilinogen Urine 0.2 0.2 - 1.0 EU/dL    Nitrite Urine Pos (A) NEG    Leukocyte Esterase Mod (A) neg - neg    Wbc, Urine Micro 20-25 (A) neg - 2    RBC Micro Urine 0-2 neg - 2    EP/HPF occ     Bacteria Urine Heavy (A) neg - neg    Casts/LPF neg     Miscellaneous         If you have any questions or concerns, please call the clinic at the number listed above.       Sincerely,        Loraine Garvey MD

## 2018-11-09 NOTE — PROGRESS NOTES
SUBJECTIVE:   Amina Matute is a 83 year old female who presents to clinic today for the following health issues:      Diabetes Follow-up      Patient is checking blood sugars: weekly- fasting- 130's    Diabetic concerns: None     Symptoms of hypoglycemia (low blood sugar): none     Paresthesias (numbness or burning in feet) or sores: known neuropathy- toes onleft foot feel numb     Date of last diabetic eye exam:  Up to date: no diabetic retinopathy    BP Readings from Last 2 Encounters:   11/09/18 130/74   09/26/18 124/68     Hemoglobin A1C (%)   Date Value   04/20/2018 6.1   11/30/2017 6.2     LDL Cholesterol Calculated (mg/dL (calc))   Date Value   11/30/2017 99   07/06/2017 152 (H)       Diabetes Management Resources    Amount of exercise or physical activity: going up and down steps, gardening- takes care of great granddaughter-     Problems taking medications regularly: No     Medication side effects: none    Diet: low salt and low fat/cholesterol (eating less)        PROBLEMS TO ADD ON...  Plantar fasciitis-seen by podiatry  Bought new shoes- made a huge difference  Doing better    Has had a cough, post nasal drainage the past two weeks  She was laying in bed when she had a cough- got immediate pain in her left mid abdomen  She continues to feel the pain when she rolls over or gets from standing to sitting    History of UTI-self cath for neurogenic bladder  Changing her catheters more often- no symptoms or concerns  Will be leaving for [place] after [place]- likes to carry a supply of antibiotics should she get a UTI when out of state  She has a bottle of 4-5 days of bactrim - she will call me from [place] if she hs concerns    Problem list and histories reviewed & adjusted, as indicated.  Additional history: as documented    Patient Active Problem List   Diagnosis     Essential hypertension, benign     Disorder of bone and cartilage     Neurogenic bladder     Hyperlipidemia     Diabetes mellitus  type II, controlled, with no complications (H)     ACP (advance care planning)     Lumbar disc herniation with radiculopathy     Health Care Home     Past Surgical History:   Procedure Laterality Date     C APPENDECTOMY  1996     C VAGINAL HYSTERECTOMY  2007    Hysterectomy, Vaginal     CYSTOSCOPY  2006    urethral dilation     HC COLONOSCOPY THRU STOMA, DIAGNOSTIC  5/2002    Normal     HC DILATION/CURETTAGE DIAG/THER NON OB  10/2005    Dr Fisher     SIGMOIDOSCOPY,DIAGNOSTIC  1997       Social History   Substance Use Topics     Smoking status: Never Smoker     Smokeless tobacco: Never Used     Alcohol use No     Family History   Problem Relation Age of Onset     Diabetes Brother      Diabetes Sister      Cancer Father      lung -      Hypertension Mother      Osteoporosis Mother      bilateral hip fractures, in her 70's     Breast Cancer Sister      Cancer Sister      leukemia         Current Outpatient Prescriptions   Medication Sig Dispense Refill     ASPIRIN 81 MG OR TABS TWO DAILY 100 3     Cyanocobalamin (B-12) 1000 MCG CAPS        fish oil-omega-3 fatty acids (FISH OIL) 1000 MG capsule Take 2 g by mouth daily.       glucose blood VI test strips (ONE TOUCH ULTRA TEST) strip 1 Box by In Vitro route daily. 100 strip prn     lisinopril (PRINIVIL/ZESTRIL) 5 MG tablet Take 1 tablet (5 mg) by mouth daily 90 tablet 1     metFORMIN (GLUCOPHAGE-XR) 500 MG 24 hr tablet Take 2 tablets (1,000 mg) by mouth daily (with dinner) 180 tablet 1     rosuvastatin (CRESTOR) 5 MG tablet TAKE ONE TABLET BY MOUTH ONCE DAILY 90 tablet 1     [DISCONTINUED] lisinopril (PRINIVIL/ZESTRIL) 5 MG tablet Take 1 tablet (5 mg) by mouth daily 30 tablet 0     [DISCONTINUED] metFORMIN (GLUCOPHAGE-XR) 500 MG 24 hr tablet Take 2 tablets (1,000 mg) by mouth daily (with dinner) 180 tablet 1     [DISCONTINUED] rosuvastatin (CRESTOR) 5 MG tablet Take 1 tablet (5 mg) by mouth daily 90 tablet 3       Reviewed and updated as needed this visit by  clinical staff  Tobacco  Allergies  Meds  Problems       Reviewed and updated as needed this visit by Provider              ROS:  CONSTITUTIONAL: NEGATIVE for fever, chills, change in weight  ENT/MOUTH: POSITIVE for URI- cough the past two weeks, improving. No fever  RESP: NEGATIVE for significant cough or SOB  CV: NEGATIVE for chest pain, palpitations or peripheral edema  GI: NEGATIVE for nausea, abdominal pain, heartburn, or change in bowel habits  : she denies dysuria  MUSCULOSKELETAL: abdominal pain with going from laying to sitting  Neuro: less feeling in left toes       OBJECTIVE:     /74 (BP Location: Left arm, Patient Position: Sitting, Cuff Size: Adult Regular)  Pulse 74  Temp 98.2  F (36.8  C) (Oral)  Wt 60.3 kg (133 lb)  SpO2 99%  BMI 24.33 kg/m2  Body mass index is 24.33 kg/(m^2).   GENERAL: healthy, alert and no distress  NECK: no adenopathy, no asymmetry, masses, or scars and thyroid normal to palpation  RESP: lungs clear to auscultation - no rales, rhonchi or wheezes  CV: regular rate and rhythm, normal S1 S2, no S3 or S4, no murmur, click or rub, no peripheral edema and peripheral pulses strong  ABDOMEN: soft, nontender, no hepatosplenomegaly, no masses and bowel sounds normal      Diabetic foot exam:  Dorsalis pedis pulse R:4  Dorsalis pedis pulse L: 4  Skin temperature R:normal  Skin temperature L:normal  Capillary refill R:normal  Capillary refill L:normal  Hair growth R:normal  Hair growth L:normal  Nail growth R/L:normal  Monofilament R foot:normal monofilament exam,    Monofilament L foot:normal monofilament exam, but to feel filament- needs to be firm touch, not light    Diagnostic Test Results:  Results for orders placed or performed in visit on 11/09/18   Hemoglobin A1c (BFP)   Result Value Ref Range    Hemoglobin A1C 6.0 4.0 - 7.0 %       ASSESSMENT/PLAN:   (E11.9) Controlled type 2 diabetes mellitus without complication, without long-term current use of insulin (H)   (primary encounter diagnosis)  Comment: doing well-  Hemoglobin A1C at goal- medications refilled at current doses  Plan: Hemoglobin A1c (BFP), VENOUS COLLECTION,         lisinopril (PRINIVIL/ZESTRIL) 5 MG tablet,         metFORMIN (GLUCOPHAGE-XR) 500 MG 24 hr tablet,         Lipid Profile, ALT (QUEST), TSH with free T4         reflex (QUEST), Glucose Fasting (BFP),         rosuvastatin (CRESTOR) 5 MG tablet            (I10) Essential hypertension, benign  Comment:  Controlled- RX refilled at current dsoe  Plan: lisinopril (PRINIVIL/ZESTRIL) 5 MG tablet            (S39.011A) Strain of abdominal muscle, initial encounter  Comment:   Plan: observation- analgesics    (J06.9) Viral upper respiratory tract infection  Comment:   Plan: symptomatic treatment- observation  Call or return to clinic prn if these symtoms worsen, fail to improve as anticipated, or if new symptoms develop.      (N31.9) Neurogenic bladder  Comment:  Abnormal urinalysis - advised to return for culture- confirms UTI- sensitive to macrobid- normal creatinine- treat for two weeks  Plan: HCL  Urinalysis, Routine (BFP)           Follow up for diabetis, hypertension in six months    Loraine Garvey MD  [facility]

## 2018-11-09 NOTE — NURSING NOTE
Patient is here for medication check today.    Pre-visit Screening:  Immunizations:  up to date  Colonoscopy:  is up to date  Mammogram: is up to date  Asthma Action Test/Plan:  THA  PHQ9:  NA  GAD7:  NA  Questioned patient about current smoking habits Pt. has never smoked.  Ok to leave detailed message on voice mail for today's visit only Yes, phone # [phone]

## 2018-11-09 NOTE — MR AVS SNAPSHOT
After Visit Summary   11/9/2018    Amina Matute    MRN: [id number]           Patient Information     Date Of Birth          1935        Visit Information        Provider Department      11/9/2018 7:30 AM Loraine Garvey MD [facility]        Today's Diagnoses     Controlled type 2 diabetes mellitus without complication, without long-term current use of insulin (H)    -  1    Essential hypertension, benign        Strain of abdominal muscle, initial encounter        Viral upper respiratory tract infection        Neurogenic bladder          Care Instructions    New Recombinant shingles vaccine (Shingrix): get at pharmacy            Follow-ups after your visit        Who to contact     If you have questions or need follow up information about today's clinic visit or your schedule please contact [facility] directly at [phone].  Normal or non-critical lab and imaging results will be communicated to you by MyChart, letter or phone within 4 business days after the clinic has received the results. If you do not hear from us within 7 days, please contact the clinic through MyChart or phone. If you have a critical or abnormal lab result, we will notify you by phone as soon as possible.  Submit refill requests through [employer] or call your pharmacy and they will forward the refill request to us. Please allow 3 business days for your refill to be completed.          Additional Information About Your Visit        Care EveryWhere ID     This is your Care EveryWhere ID. This could be used by other organizations to access your [facility] medical records  [id number]        Your Vitals Were     Pulse Temperature Pulse Oximetry BMI (Body Mass Index)          74 98.2  F (36.8  C) (Oral) 99% 24.33 kg/m2         Blood Pressure from Last 3 Encounters:   11/09/18 130/74   09/26/18 124/68   04/20/18 132/72    Weight from Last 3 Encounters:   11/09/18 60.3 kg (133  lb)   09/26/18 60 kg (132 lb 3.2 oz)   04/20/18 60.8 kg (134 lb)              We Performed the Following     ALT (QUEST)     Glucose Fasting (BFP)     HCL  Urinalysis, Routine (BFP)     Hemoglobin A1c (BFP)     Lipid Profile     TSH with free T4 reflex (QUEST)     VENOUS COLLECTION          Today's Medication Changes          These changes are accurate as of 11/9/18 11:59 PM.  If you have any questions, ask your nurse or doctor.               These medicines have changed or have updated prescriptions.        Dose/Directions    rosuvastatin 5 MG tablet   Commonly known as:  CRESTOR   This may have changed:  See the new instructions.   Used for:  Controlled type 2 diabetes mellitus without complication, without long-term current use of insulin (H)   Changed by:  Loraine Garvey MD        Dose:  5 mg   Take 1 tablet (5 mg) by mouth daily   Quantity:  90 tablet   Refills:  3            Where to get your medicines      These medications were sent to [facility] [address] [place] [address]     Phone:  [phone]     lisinopril 5 MG tablet    metFORMIN 500 MG 24 hr tablet    rosuvastatin 5 MG tablet                Primary Care Provider Office Phone # Fax #    Loraine Garvey [phone] [address] [place] [address]        Equal Access to Services     [facility]: Hadii aad ku hadasho Soomaali, waaxda luqadaha, qaybta kaalmada adeegyada, waxay perfecto haycheryl riley. So [facility] [phone].    ATENCIÓN: Si habla español, tiene a carreno disposición servicios gratuitos de asistencia lingüística. Isaak al [phone].    We comply with applicable federal civil rights laws and [place] laws. We do not discriminate on the basis of race, color, national origin, age, disability, sex, sexual orientation, or gender identity.            Thank you!     Thank you for choosing [facility]  for your care. Our goal is always to provide you with excellent care. Hearing back from our patients is one way we can continue to improve our services. Please take a few minutes to complete the written survey that you may receive in the mail after your visit with us. Thank you!             Your Updated Medication List - Protect others around you: Learn how to safely use, store and throw away your medicines at www.disposemymeds.org.          This list is accurate as of 11/9/18 11:59 PM.  Always use your most recent med list.                   Brand Name Dispense Instructions for use Diagnosis    aspirin 81 MG tablet     100    TWO DAILY    Essential hypertension, benign, Other and unspecified hyperlipidemia       B-12 1000 MCG Caps           blood glucose monitoring test strip    ONETOUCH ULTRA    100 strip    1 Box by In Vitro route daily.    Type II or unspecified type diabetes mellitus without mention of complication, not stated as uncontrolled       fish oil-omega-3 fatty acids 1000 MG capsule      Take 2 g by mouth daily.    Other and unspecified hyperlipidemia       lisinopril 5 MG tablet    PRINIVIL/ZESTRIL    90 tablet    Take 1 tablet (5 mg) by mouth daily    Controlled type 2 diabetes mellitus without complication, without long-term current use of insulin (H), Essential hypertension, benign       metFORMIN 500 MG 24 hr tablet    GLUCOPHAGE-XR    180 tablet    Take 2 tablets (1,000 mg) by mouth daily (with dinner)    Controlled type 2 diabetes mellitus without complication, without long-term current use of insulin (H)       nitroFURantoin (macrocrystal-monohydrate) 100 MG capsule    MACROBID    28 capsule    Take 1 capsule (100 mg) by mouth 2 times daily    Urinary tract infection associated with catheterization of urinary tract, unspecified indwelling urinary catheter type, initial encounter (H)       rosuvastatin 5 MG tablet    CRESTOR    90 tablet    Take 1 tablet (5 mg) by mouth daily     Controlled type 2 diabetes mellitus without complication, without long-term current use of insulin (H)

## 2018-11-10 LAB
ALT SERPL-CCNC: 14 U/L (ref 6–29)
CHOLEST SERPL-MCNC: 177 MG/DL
CHOLEST/HDLC SERPL: 2.3 (CALC)
HDLC SERPL-MCNC: 78 MG/DL
LDLC SERPL CALC-MCNC: 79 MG/DL (CALC)
NONHDLC SERPL-MCNC: 99 MG/DL (CALC)
TRIGL SERPL-MCNC: 113 MG/DL
TSH SERPL-ACNC: 1.71 MIU/L (ref 0.4–4.5)

## 2018-11-12 DIAGNOSIS — R82.90 ABNORMAL URINE: ICD-10-CM

## 2018-11-12 PROCEDURE — 87086 URINE CULTURE/COLONY COUNT: CPT | Mod: 90 | Performed by: FAMILY MEDICINE

## 2018-11-12 PROCEDURE — 87186 SC STD MICRODIL/AGAR DIL: CPT | Mod: 90 | Performed by: FAMILY MEDICINE

## 2018-11-12 PROCEDURE — 87088 URINE BACTERIA CULTURE: CPT | Mod: 90 | Performed by: FAMILY MEDICINE

## 2018-11-12 PROCEDURE — 87077 CULTURE AEROBIC IDENTIFY: CPT | Mod: 90 | Performed by: FAMILY MEDICINE

## 2018-11-12 RX ORDER — ROSUVASTATIN CALCIUM 5 MG/1
5 TABLET, COATED ORAL DAILY
Qty: 90 TABLET | Refills: 3 | Status: SHIPPED | OUTPATIENT
Start: 2018-11-12 | End: 2019-06-21

## 2018-11-15 LAB — URINE VOIDED CULTURE: ABNORMAL

## 2018-11-16 RX ORDER — NITROFURANTOIN 25; 75 MG/1; MG/1
100 CAPSULE ORAL 2 TIMES DAILY
Qty: 28 CAPSULE | Refills: 0 | Status: SHIPPED | OUTPATIENT
Start: 2018-11-16 | End: 2019-06-21

## 2018-11-16 NOTE — TELEPHONE ENCOUNTER
Left message for patient to call the clinic back when she is able to give message from Dr. Garvey.

## 2018-11-16 NOTE — TELEPHONE ENCOUNTER
Culture results are back    Let patient know that a prescription for macrobid was sent to her pharmacy  I want her to take for a full two weeks

## 2018-11-19 ENCOUNTER — TELEPHONE (OUTPATIENT)
Dept: FAMILY MEDICINE | Facility: CLINIC | Age: 83
End: 2018-11-19

## 2018-11-19 NOTE — TELEPHONE ENCOUNTER
Patient left a message 11/16/18 @ 4:37pm stating that she is returning Dr. Garvey's call.  Did not say what it was RE.    Please call patient @ [phone]    Thank you

## 2018-11-19 NOTE — TELEPHONE ENCOUNTER
Called patient back to see if she had anything she wanted to discuss with Dr. Garvey and left message for her to call us back. Routing to Dr. Garvey for review, did we call her about her urine culture results?

## 2018-11-19 NOTE — TELEPHONE ENCOUNTER
Spoke with pt. Stated culture came back positive for UTI. Advised her to  medication form [facility] and take for 2 weeks. Pt understood.

## 2018-12-12 NOTE — TELEPHONE ENCOUNTER
Patient is due for office visit- please call patient to schedule fasting appointment   [No Acute Distress] : no acute distress [Well Nourished] : well nourished [Well Developed] : well developed [Well-Appearing] : well-appearing [Normal Sclera/Conjunctiva] : normal sclera/conjunctiva [PERRL] : pupils equal round and reactive to light [Normal Outer Ear/Nose] : the outer ears and nose were normal in appearance [Normal Oropharynx] : the oropharynx was normal [Normal TMs] : both tympanic membranes were normal [No JVD] : no jugular venous distention [Supple] : supple [No Lymphadenopathy] : no lymphadenopathy [No Respiratory Distress] : no respiratory distress  [Clear to Auscultation] : lungs were clear to auscultation bilaterally [No Accessory Muscle Use] : no accessory muscle use [Normal Rate] : normal rate  [Regular Rhythm] : with a regular rhythm [Normal S1, S2] : normal S1 and S2 [No Murmur] : no murmur heard [Pedal Pulses Present] : the pedal pulses are present [No Edema] : there was no peripheral edema [No Extremity Clubbing/Cyanosis] : no extremity clubbing/cyanosis [Soft] : abdomen soft [Non Tender] : non-tender [Non-distended] : non-distended [Normal Bowel Sounds] : normal bowel sounds [Normal Supraclavicular Nodes] : no supraclavicular lymphadenopathy [Normal Posterior Cervical Nodes] : no posterior cervical lymphadenopathy [Normal Anterior Cervical Nodes] : no anterior cervical lymphadenopathy [No CVA Tenderness] : no CVA  tenderness [No Spinal Tenderness] : no spinal tenderness [No Joint Swelling] : no joint swelling [Grossly Normal Strength/Tone] : grossly normal strength/tone [No Rash] : no rash [Normal Gait] : normal gait [Coordination Grossly Intact] : coordination grossly intact [Normal Affect] : the affect was normal [Normal Insight/Judgement] : insight and judgment were intact

## 2019-06-21 ENCOUNTER — OFFICE VISIT (OUTPATIENT)
Dept: FAMILY MEDICINE | Facility: CLINIC | Age: 84
End: 2019-06-21

## 2019-06-21 VITALS
BODY MASS INDEX: 23.96 KG/M2 | WEIGHT: 131 LBS | HEART RATE: 72 BPM | OXYGEN SATURATION: 98 % | SYSTOLIC BLOOD PRESSURE: 138 MMHG | RESPIRATION RATE: 20 BRPM | DIASTOLIC BLOOD PRESSURE: 72 MMHG

## 2019-06-21 DIAGNOSIS — N31.9 NEUROGENIC BLADDER: ICD-10-CM

## 2019-06-21 DIAGNOSIS — I10 ESSENTIAL HYPERTENSION, BENIGN: ICD-10-CM

## 2019-06-21 DIAGNOSIS — E11.9 CONTROLLED TYPE 2 DIABETES MELLITUS WITHOUT COMPLICATION, WITHOUT LONG-TERM CURRENT USE OF INSULIN (H): ICD-10-CM

## 2019-06-21 DIAGNOSIS — E78.00 PURE HYPERCHOLESTEROLEMIA: ICD-10-CM

## 2019-06-21 LAB
BUN SERPL-MCNC: 14 MG/DL (ref 7–25)
BUN/CREATININE RATIO: 16.9 (ref 6–22)
CALCIUM SERPL-MCNC: 9.7 MG/DL (ref 8.6–10.3)
CHLORIDE SERPLBLD-SCNC: 104.6 MMOL/L (ref 98–110)
CHOLEST SERPL-MCNC: 166 MG/DL (ref 0–199)
CHOLEST/HDLC SERPL: 3 {RATIO} (ref 0–5)
CO2 SERPL-SCNC: 21.9 MMOL/L (ref 20–32)
CREAT SERPL-MCNC: 0.83 MG/DL (ref 0.7–1.18)
GLUCOSE SERPL-MCNC: 103 MG/DL (ref 60–99)
HBA1C MFR BLD: 6.1 % (ref 4–7)
HDLC SERPL-MCNC: 63 MG/DL (ref 40–150)
LDLC SERPL CALC-MCNC: 80 MG/DL (ref 0–130)
POTASSIUM SERPL-SCNC: 4.4 MMOL/L (ref 3.5–5.3)
SODIUM SERPL-SCNC: 139.4 MMOL/L (ref 135–146)
TRIGL SERPL-MCNC: 113 MG/DL (ref 0–149)

## 2019-06-21 PROCEDURE — 36415 COLL VENOUS BLD VENIPUNCTURE: CPT | Performed by: FAMILY MEDICINE

## 2019-06-21 PROCEDURE — 80048 BASIC METABOLIC PNL TOTAL CA: CPT | Performed by: FAMILY MEDICINE

## 2019-06-21 PROCEDURE — 83036 HEMOGLOBIN GLYCOSYLATED A1C: CPT | Performed by: FAMILY MEDICINE

## 2019-06-21 PROCEDURE — 99214 OFFICE O/P EST MOD 30 MIN: CPT | Performed by: FAMILY MEDICINE

## 2019-06-21 PROCEDURE — 84460 ALANINE AMINO (ALT) (SGPT): CPT | Performed by: FAMILY MEDICINE

## 2019-06-21 PROCEDURE — 80061 LIPID PANEL: CPT | Performed by: FAMILY MEDICINE

## 2019-06-21 RX ORDER — ROSUVASTATIN CALCIUM 5 MG/1
5 TABLET, COATED ORAL DAILY
Qty: 90 TABLET | Refills: 3 | Status: SHIPPED | OUTPATIENT
Start: 2019-06-21 | End: 2020-06-01

## 2019-06-21 RX ORDER — METFORMIN HCL 500 MG
1000 TABLET, EXTENDED RELEASE 24 HR ORAL
Qty: 180 TABLET | Refills: 3 | Status: SHIPPED | OUTPATIENT
Start: 2019-06-21 | End: 2020-06-01

## 2019-06-21 RX ORDER — LISINOPRIL 5 MG/1
5 TABLET ORAL DAILY
Qty: 90 TABLET | Refills: 3 | Status: SHIPPED | OUTPATIENT
Start: 2019-06-21 | End: 2020-06-01

## 2019-06-21 NOTE — NURSING NOTE
Chief Complaint   Patient presents with     Recheck Medication     fasting medication check      Pre-visit Screening:  Immunizations:  up to date  Colonoscopy:  is up to date  Mammogram: is up to date  Asthma Action Test/Plan:  NA  PHQ9:  NA  GAD7:  NA  Questioned patient about current smoking habits Pt. has never smoked.  Ok to leave detailed message on voice mail for today's visit only Yes, phone # [phone]

## 2019-06-21 NOTE — PROGRESS NOTES
Subjective     Amina Matute is a 83 year old female who presents to clinic today for the following health issues:    HPI   Hyperlipidemia Follow-Up      Are you having any of the following symptoms? (Select all that apply)  No complaints of shortness of breath, chest pain or pressure.  No increased sweating or nausea with activity.  No left-sided neck or arm pain.  No complaints of pain in calves when walking 1-2 blocks.    Are you regularly taking any medication or supplement to lower your cholesterol?   Yes- rosuvastatin    Are you having muscle aches or other side effects that you think could be caused by your cholesterol lowering medication?  No     Hypertension Follow-up      Do you check your blood pressure regularly outside of the clinic? No     Are you following a low salt diet? Yes-still cooking from scratch/       Amount of exercise or physical activity: active lifestyle- cares for a grandchild- will walk while child rides bike    Problems taking medications regularly: No    Medication side effects: none    Diet: low salt and low fat/cholesterol      PROBLEMS TO ADD ON...    Neurogenic bladder- self cath- history of UTI's- NO UTI's the past six months    Pre-diabetis-  Hemoglobin A1C is stable at 6.1- tolerates metformin  Weight is stable: Body mass index is 23.96 kg/m .       Patient Active Problem List   Diagnosis     Essential hypertension, benign     Disorder of bone and cartilage     Neurogenic bladder     Hyperlipidemia     Diabetes mellitus type II, controlled, with no complications (H)     ACP (advance care planning)     Lumbar disc herniation with radiculopathy     Health Care Home     Past Surgical History:   Procedure Laterality Date     C APPENDECTOMY  1996     C VAGINAL HYSTERECTOMY  2007    Hysterectomy, Vaginal     CYSTOSCOPY  2006    urethral dilation     HC COLONOSCOPY THRU STOMA, DIAGNOSTIC  5/2002    Normal     HC DILATION/CURETTAGE DIAG/THER NON OB  10/2005    Dr Fisher      SIGMOIDOSCOPY,DIAGNOSTIC  1997       Social History     Tobacco Use     Smoking status: Never Smoker     Smokeless tobacco: Never Used   Substance Use Topics     Alcohol use: No     Alcohol/week: 0.0 oz     Family History   Problem Relation Age of Onset     Diabetes Brother      Diabetes Sister      Cancer Father         lung -      Hypertension Mother      Osteoporosis Mother         bilateral hip fractures, in her 70's     Breast Cancer Sister      Cancer Sister         leukemia         Current Outpatient Medications   Medication Sig Dispense Refill     ASPIRIN 81 MG OR TABS TWO DAILY 100 3     Cyanocobalamin (B-12) 1000 MCG CAPS        fish oil-omega-3 fatty acids (FISH OIL) 1000 MG capsule Take 2 g by mouth daily.       glucose blood VI test strips (ONE TOUCH ULTRA TEST) strip 1 Box by In Vitro route daily. 100 strip prn     lisinopril (PRINIVIL/ZESTRIL) 5 MG tablet Take 1 tablet (5 mg) by mouth daily 90 tablet 3     metFORMIN (GLUCOPHAGE-XR) 500 MG 24 hr tablet Take 2 tablets (1,000 mg) by mouth daily (with dinner) 180 tablet 3     rosuvastatin (CRESTOR) 5 MG tablet Take 1 tablet (5 mg) by mouth daily 90 tablet 3     BP Readings from Last 3 Encounters:   06/21/19 138/72   11/09/18 130/74   09/26/18 124/68    Wt Readings from Last 3 Encounters:   06/21/19 59.4 kg (131 lb)   11/09/18 60.3 kg (133 lb)   09/26/18 60 kg (132 lb 3.2 oz)                   Reviewed and updated as needed this visit by Provider         Review of Systems   ROS COMP: Constitutional, HEENT, cardiovascular, pulmonary, GI, , musculoskeletal, neuro, skin, endocrine and psych systems are negative, except as otherwise noted.      Objective    /72 (BP Location: Left arm, Patient Position: Sitting, Cuff Size: Adult Regular)   Pulse 72   Resp 20   Wt 59.4 kg (131 lb)   SpO2 98%   BMI 23.96 kg/m    There is no height or weight on file to calculate BMI.  Physical Exam   GENERAL: healthy, alert and no distress  NECK: no adenopathy,  no asymmetry, masses, or scars and thyroid normal to palpation  RESP: lungs clear to auscultation - no rales, rhonchi or wheezes  CV: regular rate and rhythm, normal S1 S2, no S3 or S4, no murmur, click or rub, no peripheral edema and peripheral pulses strong  MS: no gross musculoskeletal defects noted, no edema    Diagnostic Test Results:  Labs reviewed in Epic  Results for orders placed or performed in visit on 06/21/19   Basic Metabolic Panel (BFP)   Result Value Ref Range    Carbon Dioxide 21.9 20 - 32 mmol/L    Creatinine 0.83 0.70 - 1.18 mg/dL    Glucose 103 (A) 60 - 99 mg/dL    Sodium 139.4 135 - 146 mmol/L    Potassium 4.40 3.5 - 5.3 mmol/L    Chloride 104.6 98 - 110 mmol/L    Urea Nitrogen 14 7 - 25 mg/dL    Calcium 9.7 8.6 - 10.3 mg/dL    BUN/Creatinine Ratio 16.9 6 - 22   Hemoglobin A1c (BFP)   Result Value Ref Range    Hemoglobin A1C 6.1 4.0 - 7.0 %   Lipid Panel (BFP)   Result Value Ref Range    Cholesterol 166 0 - 199 mg/dL    Triglycerides 113 0 - 149 mg/dL    HDL Cholesterol 63 40 - 150 mg/dL    LDL Cholesterol Direct 80 0 - 130 mg/dL    Cholesterol/HDL Ratio 3 0 - 5           Assessment & Plan   (E11.9) Controlled type 2 diabetes mellitus without complication, without long-term current use of insulin (H)  Comment: doing well- continue metformin- tolerates  Plan: lisinopril (PRINIVIL/ZESTRIL) 5 MG tablet,         metFORMIN (GLUCOPHAGE-XR) 500 MG 24 hr tablet,         rosuvastatin (CRESTOR) 5 MG tablet, Basic         Metabolic Panel (BFP), Hemoglobin A1c (BFP),         VENOUS COLLECTION, Lipid Panel (BFP)             (I10) Essential hypertension, benign  Comment: controlled- refill of current medications  Plan: lisinopril (PRINIVIL/ZESTRIL) 5 MG tablet,         Basic Metabolic Panel (BFP), Lipid Panel (BFP),        ALT (BFP)            (N31.9) Neurogenic bladder  Comment: no UA  Plan: no symptoms of UTI    (E78.00) Pure hypercholesterolemia  Comment: LDL at goal  Plan: rosuvastatin (CRESTOR) 5 MG  tablet, VENOUS         COLLECTION, Lipid Panel (BFP), ALT (BFP)              FUTURE APPOINTMENTS:       - Follow-up visit in 6 months  No follow-ups on file.    Loraine Garvey MD  [facility]

## 2019-06-24 LAB — ALT 1742-6: 4 U/L (ref 5–30)

## 2019-09-10 ENCOUNTER — TRANSFERRED RECORDS (OUTPATIENT)
Dept: HEALTH INFORMATION MANAGEMENT | Facility: CLINIC | Age: 84
End: 2019-09-10

## 2019-10-31 ENCOUNTER — OFFICE VISIT (OUTPATIENT)
Dept: FAMILY MEDICINE | Facility: CLINIC | Age: 84
End: 2019-10-31

## 2019-10-31 VITALS
BODY MASS INDEX: 23.74 KG/M2 | HEIGHT: 62 IN | HEART RATE: 63 BPM | TEMPERATURE: 98 F | OXYGEN SATURATION: 98 % | DIASTOLIC BLOOD PRESSURE: 68 MMHG | WEIGHT: 129 LBS | SYSTOLIC BLOOD PRESSURE: 128 MMHG

## 2019-10-31 DIAGNOSIS — H25.9 AGE-RELATED CATARACT OF BOTH EYES, UNSPECIFIED AGE-RELATED CATARACT TYPE: ICD-10-CM

## 2019-10-31 DIAGNOSIS — Z01.818 PREOPERATIVE EXAMINATION: Primary | ICD-10-CM

## 2019-10-31 LAB
ERYTHROCYTE [DISTWIDTH] IN BLOOD BY AUTOMATED COUNT: 12.5 %
HCT VFR BLD AUTO: 39.8 % (ref 35–47)
HEMOGLOBIN: 13.1 G/DL (ref 11.7–15.7)
MCH RBC QN AUTO: 31 PG (ref 26–33)
MCHC RBC AUTO-ENTMCNC: 32.9 G/DL (ref 31–36)
MCV RBC AUTO: 94.4 FL (ref 78–100)
PLATELET COUNT - QUEST: 162 10^9/L (ref 150–375)
RBC # BLD AUTO: 4.22 10*12/L (ref 3.8–5.2)
WBC # BLD AUTO: 7 10*9/L (ref 4–11)

## 2019-10-31 PROCEDURE — 99214 OFFICE O/P EST MOD 30 MIN: CPT | Performed by: FAMILY MEDICINE

## 2019-10-31 PROCEDURE — 36415 COLL VENOUS BLD VENIPUNCTURE: CPT | Performed by: FAMILY MEDICINE

## 2019-10-31 PROCEDURE — 85027 COMPLETE CBC AUTOMATED: CPT | Performed by: FAMILY MEDICINE

## 2019-10-31 ASSESSMENT — MIFFLIN-ST. JEOR: SCORE: 993.39

## 2019-10-31 NOTE — PROGRESS NOTES
[facility]  [address]  [address]  [facility] [address]  [phone]  Dept: [phone]    PRE-OP EVALUATION:  Today's date: 10/31/2019    Amina Matute (: 1935) presents for pre-operative evaluation assessment as requested by Dr. Burns.  She requires evaluation and anesthesia risk assessment prior to undergoing surgery/procedure for treatment of Cataracts-bilateral .    Proposed Surgery/ Procedure: Cataracts- bilateral  Date of Surgery/ Procedure: 19- left eye, 19- right eye  Time of Surgery/ Procedure: 1:30PM  Hospital/Surgical Facility: [facility]  Fax number for surgical facility: [phone]  Primary Physician: Loraine Garvey  Type of Anesthesia Anticipated: to be determined    Patient has a Health Care Directive or Living Will:  YES    1. NO - Do you have a history of heart attack, stroke, stent, bypass or surgery on an artery in the head, neck, heart or legs?  2. NO - Do you ever have any pain or discomfort in your chest?  3. NO - Do you have a history of  Heart Failure?  4. NO - Are you troubled by shortness of breath when: walking on the level, up a slight hill or at night?  5. NO - Do you currently have a cold, bronchitis or other respiratory infection?  6. NO - Do you have a cough, shortness of breath or wheezing?  7. NO - Do you sometimes get pains in the calves of your legs when you walk?  8. NO - Do you or anyone in your family have previous history of blood clots?  9. NO - Do you or does anyone in your family have a serious bleeding problem such as prolonged bleeding following surgeries or cuts?  10. NO - Have you ever had problems with anemia or been told to take iron pills?  11. NO - Have you had any abnormal blood loss such as black, tarry or bloody stools, or abnormal vaginal bleeding?  12. NO - Have you ever had a blood transfusion?  13. NO - Have you or any of your relatives ever had problems with  anesthesia?  14. NO - Do you have sleep apnea, excessive snoring or daytime drowsiness?  15. NO - Do you have any prosthetic heart valves?  16. NO - Do you have prosthetic joints?  17. NO - Is there any chance that you may be pregnant?      HPI:     HPI related to upcoming procedure:  Bilateral cataract surgery           MEDICAL HISTORY:     Patient Active Problem List    Diagnosis Date Noted     Age-related cataract of both eyes 10/31/2019     Priority: Medium     ACP (advance care planning) 12/12/2011     Priority: Medium     Advance Care Planning:   ACP Review and Resources Provided:  Reviewed chart for advance care plan.  Amina Matute has no plan or code status on file however states presence of ACP document. Copy requested. Confirmed code status reflects current choices pending receipt of document/advance care plan review. Confirmed/documented designated decision maker(s). See permanent comments section of demographics in clinical tab.   Added by Silvia Vang on 6/11/2014               Diabetes mellitus type II, controlled, with no complications (H) 04/02/2010     Priority: Medium     Neurogenic bladder 05/09/2007     Priority: Medium     Problem list name updated by automated process. Provider to review       Hyperlipidemia 05/09/2007     Priority: Medium     Problem list name updated by automated process. Provider to review       Disorder of bone and cartilage 06/13/2003     Priority: Medium     Problem list name updated by automated process. Provider to review       Essential hypertension, benign      Priority: Medium     Health Care Home 05/30/2013     Priority: Low     State Tier Level:  Tier 1  Status:  NA  Care Coordinator:  See Letters for [facility] Care Plan            No past medical history on file.  Past Surgical History:   Procedure Laterality Date     C APPENDECTOMY  1996     C VAGINAL HYSTERECTOMY  2007    Hysterectomy, Vaginal     CYSTOSCOPY  2006    urethral dilation     HC COLONOSCOPY THRU  "STOMA, DIAGNOSTIC  5/2002    Normal     HC DILATION/CURETTAGE DIAG/THER NON OB  10/2005    Dr Fisher     SIGMOIDOSCOPY,DIAGNOSTIC  1997     Current Outpatient Medications   Medication Sig Dispense Refill     ASPIRIN 81 MG OR TABS TWO DAILY 100 3     Cyanocobalamin (B-12) 1000 MCG CAPS        glucose blood VI test strips (ONE TOUCH ULTRA TEST) strip 1 Box by In Vitro route daily. 100 strip prn     lisinopril (PRINIVIL/ZESTRIL) 5 MG tablet Take 1 tablet (5 mg) by mouth daily 90 tablet 3     metFORMIN (GLUCOPHAGE-XR) 500 MG 24 hr tablet Take 2 tablets (1,000 mg) by mouth daily (with dinner) 180 tablet 3     rosuvastatin (CRESTOR) 5 MG tablet Take 1 tablet (5 mg) by mouth daily 90 tablet 3     fish oil-omega-3 fatty acids (FISH OIL) 1000 MG capsule Take 2 g by mouth daily.       OTC products: no recent use of OTC ASA, NSAIDS or Steroids  Hold aspirin starting 10/31/2019 until after surgery    Allergies   Allergen Reactions     No Known Drug Allergies       Latex Allergy: NO    Social History     Tobacco Use     Smoking status: Never Smoker     Smokeless tobacco: Never Used   Substance Use Topics     Alcohol use: No     Alcohol/week: 0.0 standard drinks     History   Drug Use No       REVIEW OF SYSTEMS:   CONSTITUTIONAL: NEGATIVE for fever, chills, change in weight  ENT/MOUTH: NEGATIVE for ear, mouth and throat problems  RESP: NEGATIVE for significant cough or SOB  CV: NEGATIVE for chest pain, palpitations or peripheral edema  GI: NEGATIVE for nausea, abdominal pain, heartburn, or change in bowel habits  : negative for dysuria  MUSCULOSKELETAL: NEGATIVE for significant arthralgias or myalgia  PSYCHIATRIC: NEGATIVE for changes in mood or affect    EXAM:   /68 (BP Location: Right arm, Patient Position: Sitting, Cuff Size: Adult Regular)   Pulse 63   Temp 98  F (36.7  C)   Ht 1.575 m (5' 2\")   Wt 58.5 kg (129 lb)   SpO2 98%   BMI 23.59 kg/m    GENERAL APPEARANCE: healthy, alert and no distress  HENT: ear " canals and TM's normal and nose and mouth without ulcers or lesions  RESP: lungs clear to auscultation - no rales, rhonchi or wheezes  CV: regular rate and rhythm, normal S1 S2, no S3 or S4 and no murmur, click or rub   ABDOMEN: soft, nontender, no HSM or masses and bowel sounds normal  NEURO: Normal strength and tone, sensory exam grossly normal, mentation intact and speech normal    DIAGNOSTICS:   EKG: Not indicated due to non-vascular surgery and low risk of event (age <65 and without cardiac risk factors)    Hemoglobin   Date Value Ref Range Status   10/31/2019 13.1 11.7 - 15.7 g/dL Final   ]        Recent Labs   Lab Test 06/21/19  1457 06/21/19 11/09/18  0742 04/20/18  1016 04/20/18  1014  07/06/17  0912  11/02/15  1109   HGB  --   --   --   --  11.8  --  13.2  --  13.4   PLT  --   --   --   --   --   --  159  --  279   .4  --   --  139  --    < >  --    < >  --    POTASSIUM 4.40  --   --  4.4  --    < >  --    < >  --    CR 0.83  --   --  0.67  --    < >  --    < >  --    A1C  --  6.1 6.0  --   --    < >  --    < >  --     < > = values in this interval not displayed.        IMPRESSION:   Reason for surgery/procedure: bilateral cataracts    The proposed surgical procedure is considered LOW risk.    REVISED CARDIAC RISK INDEX  The patient has the following serious cardiovascular risks for perioperative complications such as (MI, PE, VFib and 3  AV Block):  No serious cardiac risks  INTERPRETATION: 0 risks: Class I (very low risk - 0.4% complication rate)    The patient has the following additional risks for perioperative complications:  No identified additional risks    Assessment   (Z01.818) Preoperative examination  (primary encounter diagnosis)  Comment:   Plan: HEMOGRAM/PLATELET (BFP), VENOUS COLLECTION            (H25.9) Age-related cataract of both eyes, unspecified age-related cataract type  Comment:   Plan:         RECOMMENDATIONS:         --Patient is to take all scheduled medications on the day  of surgery EXCEPT for modifications listed below.  NO ASA    APPROVAL GIVEN to proceed with proposed procedure, without further diagnostic evaluation       Signed Electronically by: Loraine Garvey MD    Copy of this evaluation report is provided to requesting physician.    [facility] Preop Guidelines    Revised Cardiac Risk Index

## 2019-10-31 NOTE — NURSING NOTE
Chief Complaint   Patient presents with     Pre-Op Exam     Cataracts, [facility], Dr. Burns  11/7-left  11/21-right

## 2020-05-19 DIAGNOSIS — E11.9 CONTROLLED TYPE 2 DIABETES MELLITUS WITHOUT COMPLICATION, WITHOUT LONG-TERM CURRENT USE OF INSULIN (H): ICD-10-CM

## 2020-05-20 RX ORDER — METFORMIN HCL 500 MG
TABLET, EXTENDED RELEASE 24 HR ORAL
Qty: 90 TABLET | Refills: 0 | COMMUNITY
Start: 2020-05-20

## 2020-05-20 NOTE — TELEPHONE ENCOUNTER
Amina Matute is requesting a refill of:    Refused Prescriptions:                       Disp   Refills    metFORMIN (GLUCOPHAGE-XR) 500 MG 24 hr tab*90 tab*0        Sig: TAKE 2 TABLETS BY MOUTH ONCE DAILY WITH  DINNER  Refused By: NASH SANDERS  Reason for Refusal: Patient needs appointment    Last A1C 06/21/19 advised to return in 6 months. Pt due for FASTING OV

## 2020-06-01 ENCOUNTER — OFFICE VISIT (OUTPATIENT)
Dept: FAMILY MEDICINE | Facility: CLINIC | Age: 85
End: 2020-06-01

## 2020-06-01 VITALS
BODY MASS INDEX: 24.03 KG/M2 | RESPIRATION RATE: 18 BRPM | HEIGHT: 62 IN | WEIGHT: 130.6 LBS | DIASTOLIC BLOOD PRESSURE: 64 MMHG | OXYGEN SATURATION: 98 % | TEMPERATURE: 98.1 F | SYSTOLIC BLOOD PRESSURE: 124 MMHG | HEART RATE: 90 BPM

## 2020-06-01 DIAGNOSIS — E11.9 CONTROLLED TYPE 2 DIABETES MELLITUS WITHOUT COMPLICATION, WITHOUT LONG-TERM CURRENT USE OF INSULIN (H): Primary | ICD-10-CM

## 2020-06-01 DIAGNOSIS — E78.00 PURE HYPERCHOLESTEROLEMIA: ICD-10-CM

## 2020-06-01 DIAGNOSIS — Z79.899 ENCOUNTER FOR LONG-TERM (CURRENT) USE OF MEDICATIONS: ICD-10-CM

## 2020-06-01 DIAGNOSIS — I10 ESSENTIAL HYPERTENSION, BENIGN: ICD-10-CM

## 2020-06-01 PROBLEM — H52.203 HYPEROPIA OF BOTH EYES WITH ASTIGMATISM AND PRESBYOPIA: Status: ACTIVE | Noted: 2019-12-16

## 2020-06-01 PROBLEM — H52.4 HYPEROPIA OF BOTH EYES WITH ASTIGMATISM AND PRESBYOPIA: Status: ACTIVE | Noted: 2019-12-16

## 2020-06-01 PROBLEM — H52.03 HYPEROPIA OF BOTH EYES WITH ASTIGMATISM AND PRESBYOPIA: Status: ACTIVE | Noted: 2019-12-16

## 2020-06-01 LAB
ALBUMIN SERPL-MCNC: 4.4 G/DL (ref 3.6–5.1)
ALBUMIN URINE MG/G CR: ABNORMAL MG/G CREATININE
ALBUMIN URINE MG/SPEC: 80
ALBUMIN/GLOB SERPL: 1.8 {RATIO} (ref 1–2.5)
ALP SERPL-CCNC: 68 U/L (ref 33–130)
ALT 1742-6: 4 U/L (ref 0–32)
AST 1920-8: 6 U/L (ref 0–35)
BILIRUB SERPL-MCNC: 0.6 MG/DL (ref 0.2–1.2)
BUN SERPL-MCNC: 20 MG/DL (ref 7–25)
BUN/CREATININE RATIO: 21.5 (ref 6–22)
CALCIUM SERPL-MCNC: 9.7 MG/DL (ref 8.6–10.3)
CHLORIDE SERPLBLD-SCNC: 102.8 MMOL/L (ref 98–110)
CHOLEST SERPL-MCNC: 182 MG/DL (ref 0–199)
CHOLEST/HDLC SERPL: 2 {RATIO} (ref 0–5)
CO2 SERPL-SCNC: 25.1 MMOL/L (ref 20–32)
CREAT SERPL-MCNC: 0.93 MG/DL (ref 0.7–1.18)
CREATININE URINE: 200
GLOBULIN, CALCULATED - QUEST: 2.5 (ref 1.9–3.7)
GLUCOSE SERPL-MCNC: 142 MG/DL (ref 60–99)
HBA1C MFR BLD: 6.3 % (ref 4–7)
HDLC SERPL-MCNC: 76 MG/DL (ref 40–150)
LDLC SERPL CALC-MCNC: 82 MG/DL (ref 0–130)
POTASSIUM SERPL-SCNC: 4.7 MMOL/L (ref 3.5–5.3)
PROT SERPL-MCNC: 6.9 G/DL (ref 6.1–8.1)
SODIUM SERPL-SCNC: 141.5 MMOL/L (ref 135–146)
TRIGL SERPL-MCNC: 122 MG/DL (ref 0–149)

## 2020-06-01 PROCEDURE — 99214 OFFICE O/P EST MOD 30 MIN: CPT | Performed by: FAMILY MEDICINE

## 2020-06-01 PROCEDURE — 80061 LIPID PANEL: CPT | Performed by: FAMILY MEDICINE

## 2020-06-01 PROCEDURE — 82043 UR ALBUMIN QUANTITATIVE: CPT | Performed by: FAMILY MEDICINE

## 2020-06-01 PROCEDURE — 36415 COLL VENOUS BLD VENIPUNCTURE: CPT | Performed by: FAMILY MEDICINE

## 2020-06-01 PROCEDURE — 83036 HEMOGLOBIN GLYCOSYLATED A1C: CPT | Performed by: FAMILY MEDICINE

## 2020-06-01 PROCEDURE — 80053 COMPREHEN METABOLIC PANEL: CPT | Performed by: FAMILY MEDICINE

## 2020-06-01 RX ORDER — ROSUVASTATIN CALCIUM 5 MG/1
5 TABLET, COATED ORAL DAILY
Qty: 90 TABLET | Refills: 1 | Status: SHIPPED | OUTPATIENT
Start: 2020-06-01

## 2020-06-01 RX ORDER — LISINOPRIL 5 MG/1
5 TABLET ORAL DAILY
Qty: 90 TABLET | Refills: 1 | Status: SHIPPED | OUTPATIENT
Start: 2020-06-01

## 2020-06-01 RX ORDER — METFORMIN HCL 500 MG
1000 TABLET, EXTENDED RELEASE 24 HR ORAL
Qty: 180 TABLET | Refills: 1 | Status: SHIPPED | OUTPATIENT
Start: 2020-06-01

## 2020-06-01 SDOH — ECONOMIC STABILITY: FOOD INSECURITY: HOW HARD IS IT FOR YOU TO PAY FOR THE VERY BASICS LIKE FOOD, HOUSING, MEDICAL CARE, AND HEATING?: NOT HARD AT ALL

## 2020-06-01 SDOH — ECONOMIC STABILITY: FOOD INSECURITY: WITHIN THE PAST 12 MONTHS, THE FOOD YOU BOUGHT JUST DIDN'T LAST AND YOU DIDN'T HAVE MONEY TO GET MORE.: NEVER TRUE

## 2020-06-01 SDOH — ECONOMIC STABILITY: INCOME INSECURITY: HOW HARD IS IT FOR YOU TO PAY FOR THE VERY BASICS LIKE FOOD, HOUSING, MEDICAL CARE, AND HEATING?: NOT HARD AT ALL

## 2020-06-01 SDOH — ECONOMIC STABILITY: TRANSPORTATION INSECURITY: IN THE PAST 12 MONTHS, HAS LACK OF TRANSPORTATION KEPT YOU FROM MEDICAL APPOINTMENTS OR FROM GETTING MEDICATIONS?: NO

## 2020-06-01 SDOH — ECONOMIC STABILITY: TRANSPORTATION INSECURITY
IN THE PAST 12 MONTHS, HAS THE LACK OF TRANSPORTATION KEPT YOU FROM MEDICAL APPOINTMENTS OR FROM GETTING MEDICATIONS?: NO

## 2020-06-01 SDOH — ECONOMIC STABILITY: TRANSPORTATION INSECURITY
IN THE PAST 12 MONTHS, HAS LACK OF TRANSPORTATION KEPT YOU FROM MEETINGS, WORK, OR FROM GETTING THINGS NEEDED FOR DAILY LIVING?: NO

## 2020-06-01 SDOH — ECONOMIC STABILITY: FOOD INSECURITY: WITHIN THE PAST 12 MONTHS, YOU WORRIED THAT YOUR FOOD WOULD RUN OUT BEFORE YOU GOT THE MONEY TO BUY MORE.: NEVER TRUE

## 2020-06-01 SDOH — ECONOMIC STABILITY: FOOD INSECURITY: WITHIN THE PAST 12 MONTHS, YOU WORRIED THAT YOUR FOOD WOULD RUN OUT BEFORE YOU GOT MONEY TO BUY MORE.: NEVER TRUE

## 2020-06-01 ASSESSMENT — ACTIVITIES OF DAILY LIVING (ADL): LACK_OF_TRANSPORTATION: NO

## 2020-06-01 ASSESSMENT — MIFFLIN-ST. JEOR: SCORE: 1000.65

## 2020-06-01 NOTE — PATIENT INSTRUCTIONS
A low fat diet, regular aerobic exercise like walking 30 minutes daily and weight control is the treatment recommendations at this time  Recheck 6 months    (I10) Essential hypertension, benign  Plan: lisinopril (ZESTRIL) 5 MG tablet        1)  Medication: continue current medication regimen unchanged  2)  Dietary sodium restriction  3)  Regular aerobic exercise  4)  Recheck in 6 months, sooner should new symptoms or   problems arise.    Patient Education: Reviewed risks of hypertension and principles of   treatment.        (E78.00) Pure hypercholesterolemia  Plan: rosuvastatin (CRESTOR) 5 MG tablet        1)  Medication: continue current medication regimen unchanged  2)  Low fat, low cholesterol diet  3)  Regular aerobic exercise  4)  Recheck in 6 months, sooner should new symptoms or   problems arise.    Patient Education: Reviewed risks of elevated lipids and principles   of treatment.

## 2020-06-01 NOTE — NURSING NOTE
Amina is here today for a fasting med recheck.    Pre-visit Screening:  Immunizations:  up to date  Colonoscopy:  is up to date  Mammogram: is up to date  Asthma Action Test/Plan:  THA  PHQ9:  NA  GAD7:  NA  Questioned patient about current smoking habits Pt. has never smoked.  Ok to leave detailed message on voice mail for today's visit only Yes, phone # [phone]

## 2020-06-01 NOTE — PROGRESS NOTES
"SUBJECTIVE:  Amina Matute is an 84 year old female who presents for evaluation and treatment   of Type 2 diabetes mellitus, hypertension and elevated cholesterol. Age at diagnosis 70. Family history   positive for diabetes in the patient s father, brother and sister.   Previous treatment modalities employed include diet, oral agents, exercise and ASA.   Current treatment includes diet, oral agents, exercise and ASA.     Current monitoring regimen: home blood tests - none  Home blood sugar records: refuses  Last HgbA1c: 6.3  Diabetic complications: cardiovascular disease  Cardiovascular risk factors: family history, lipids, diabetes mellitus, hypertension, obesity and sedentary life style    Current Outpatient Medications   Medication     ASPIRIN 81 MG OR TABS     Cyanocobalamin (B-12) 1000 MCG CAPS     glucose blood VI test strips (ONE TOUCH ULTRA TEST) strip     lisinopril (PRINIVIL/ZESTRIL) 5 MG tablet     metFORMIN (GLUCOPHAGE-XR) 500 MG 24 hr tablet     rosuvastatin (CRESTOR) 5 MG tablet     No current facility-administered medications for this visit.      Allergies   Allergen Reactions     No Known Drug Allergies        Social History     Tobacco Use     Smoking status: Never Smoker     Smokeless tobacco: Never Used   Substance Use Topics     Alcohol use: No     Alcohol/week: 0.0 standard drinks       Review Of Systems  Skin: negative  Eyes: negative  Ears/Nose/Throat: negative  Respiratory: No shortness of breath, dyspnea on exertion, cough, or hemoptysis  Cardiovascular: hypertension/ hyperlipidemia  Gastrointestinal: negative  Genitourinary: uses daily catheter due to retention  Musculoskeletal: negative  Neurologic: negative  Psychiatric: negative  Hematologic/Lymphatic/Immunologic: negative  Endocrine: diabetes    OBJECTIVE:  /64 (BP Location: Right arm, Patient Position: Sitting, Cuff Size: Adult Large)   Pulse 90   Temp 98.1  F (36.7  C) (Oral)   Ht 1.575 m (5' 2\")   Wt 59.2 kg (130 lb 9.6 " oz)   SpO2 98%   BMI 23.89 kg/m    General appearance: healthy, alert, no distress, cooperative and smiling  Skin: Skin color, texture, turgor normal. No rashes or lesions.  Eyes: conjunctivae/corneas clear. PERRL, EOM's intact. Fundi benign  Ears: negative  Oropharynx: Lips, mucosa, and tongue normal. Teeth and gums normal.  Neck: Neck supple. No adenopathy. Thyroid symmetric, normal size,, Carotids without bruits.  Lungs: negative, Percussion normal. Good diaphragmatic excursion. Lungs clear  Heart: negative, PMI normal. No lifts, heaves, or thrills. RRR. No murmurs, clicks gallops or rub  Abdomen: Abdomen soft, non-tender. BS normal. No masses, organomegaly  Extremities: Extremities normal. No deformities, edema, or skin discoloration.  Peripheral pulses: radial=4/4, femoral=4/4, popliteal=4/4, dorsalis pedis=4/4,  Neuro: Gait normal. Reflexes normal and symmetric. Sensation grossly WNL.    ASSESSMENT:  (E11.9) Controlled type 2 diabetes mellitus without complication, without long-term current use of insulin (H)  (primary encounter diagnosis)  Plan: Hemoglobin A1c (BFP), VENOUS COLLECTION, C FOOT        EXAM  NO CHARGE, Lipid Panel (BFP),         Comprehensive Metobolic Panel (BFP), Albumin         Random Urine Quantitative with Creat Ratio, TSH        with free T4 reflex ([facility]), lisinopril         (ZESTRIL) 5 MG tablet, metFORMIN         (GLUCOPHAGE-XR) 500 MG 24 hr tablet,         rosuvastatin (CRESTOR) 5 MG tablet        A low fat diet, regular aerobic exercise like walking 30 minutes daily and weight control is the treatment recommendations at this time  Recheck 6 months    (I10) Essential hypertension, benign  Plan: lisinopril (ZESTRIL) 5 MG tablet        1)  Medication: continue current medication regimen unchanged  2)  Dietary sodium restriction  3)  Regular aerobic exercise  4)  Recheck in 6 months, sooner should new symptoms or   problems arise.    Patient Education: Reviewed risks of hypertension and  principles of   treatment.        (E78.00) Pure hypercholesterolemia  Plan: rosuvastatin (CRESTOR) 5 MG tablet        1)  Medication: continue current medication regimen unchanged  2)  Low fat, low cholesterol diet  3)  Regular aerobic exercise  4)  Recheck in 6 months, sooner should new symptoms or   problems arise.    Patient Education: Reviewed risks of elevated lipids and principles   of treatment.        (Z79.789) Encounter for long-term (current) use of medications  Plan: I have reviewed the patient's medical history in detail and updated the computerized patient record.      PE:   Reviewed concepts of diabetes self-management stressing the primary   role of the patient in monitoring and maintaining control of   diabetes.

## 2020-06-01 NOTE — LETTER
June 2, 2020      Amina Matute  [address]  [facility] [place] [address]        Dear ,    We are writing to inform you of your test results.    Your test results fall within the expected range(s) or remain unchanged from previous results.  Please continue with current treatment plan.    Resulted Orders   Hemoglobin A1c (BFP)   Result Value Ref Range    Hemoglobin A1C 6.3 4.0 - 7.0 %   Lipid Panel (BFP)   Result Value Ref Range    Cholesterol 182 0 - 199 mg/dL    Triglycerides 122 0 - 149 mg/dL    HDL Cholesterol 76 40 - 150 mg/dL    LDL Cholesterol Direct 82 0 - 130 mg/dL    Cholesterol/HDL Ratio 2 0 - 5   Comprehensive Metobolic Panel (BFP)   Result Value Ref Range    Carbon Dioxide 25.1 20 - 32 mmol/L    Creatinine 0.93 0.70 - 1.18 mg/dL    Glucose 142 (A) 60 - 99 mg/dL    Sodium 141.5 135 - 146 mmol/L    Potassium 4.70 3.5 - 5.3 mmol/L    Chloride 102.8 98 - 110 mmol/L    Protein Total 6.9 6.1 - 8.1 g/dL    Albumin 4.4 3.6 - 5.1 g/dL    Alkaline Phosphatase 68 33 - 130 U/L    ALT 4 0 - 32 U/L    AST 6 0 - 35 U/L    Bilirubin Total 0.6 0.2 - 1.2 mg/dL    Urea Nitrogen 20 7 - 25 mg/dL    Calcium 9.7 8.6 - 10.3 mg/dL    BUN/Creatinine Ratio 21.5 6 - 22    Globulin Calculated 2.5 1.9 - 3.7    A/G Ratio 1.8 1 - 2.5   Albumin Random Urine Quantitative with Creat Ratio   Result Value Ref Range    Albumin Urine mg/spec 80 (A) <30    Albumin Urine mg/g Cr  (A) <30 MG/G Creatinine    Creatinine Urine 200 <300   TSH with free T4 reflex ([facility])   Result Value Ref Range    TSH 1.34 0.40 - 4.50 mIU/L       If you have any questions or concerns, please call the clinic at the number listed above.       Sincerely,        Sara Ramos MD

## 2020-06-02 LAB — TSH SERPL-ACNC: 1.34 MIU/L (ref 0.4–4.5)

## 2020-07-29 ENCOUNTER — TELEPHONE (OUTPATIENT)
Dept: FAMILY MEDICINE | Facility: CLINIC | Age: 85
End: 2020-07-29

## 2020-10-20 DIAGNOSIS — E11.9 CONTROLLED TYPE 2 DIABETES MELLITUS WITHOUT COMPLICATION, WITHOUT LONG-TERM CURRENT USE OF INSULIN (H): ICD-10-CM

## 2020-10-21 RX ORDER — METFORMIN HCL 500 MG
TABLET, EXTENDED RELEASE 24 HR ORAL
Qty: 180 TABLET | Refills: 0 | COMMUNITY
Start: 2020-10-21

## 2020-10-21 NOTE — TELEPHONE ENCOUNTER
Received incoming refill request for  Pending Prescriptions:                       Disp   Refills    metFORMIN (GLUCOPHAGE-XR) 500 MG 24 hr ta*180 ta*0            Sig: TAKE 2 TABLETS BY MOUTH ONCE DAILY WITH SUPPER    Patient last had a refill of this medication on 6/1/2020 for a 6 month supply. The patient should still have another 90 day supply left of this so this is being denied as being requested to soon.

## 2022-08-23 ENCOUNTER — OFFICE VISIT (OUTPATIENT)
Dept: NEUROSURGERY | Facility: CLINIC | Age: 87
End: 2022-08-23

## 2022-08-23 VITALS — OXYGEN SATURATION: 98 % | HEART RATE: 63 BPM | DIASTOLIC BLOOD PRESSURE: 78 MMHG | SYSTOLIC BLOOD PRESSURE: 167 MMHG

## 2022-08-23 DIAGNOSIS — M54.16 LUMBAR BACK PAIN WITH RADICULOPATHY AFFECTING RIGHT LOWER EXTREMITY: Primary | ICD-10-CM

## 2022-08-23 DIAGNOSIS — M48.061 SPINAL STENOSIS OF LUMBAR REGION, UNSPECIFIED WHETHER NEUROGENIC CLAUDICATION PRESENT: ICD-10-CM

## 2022-08-23 PROCEDURE — 99203 OFFICE O/P NEW LOW 30 MIN: CPT | Performed by: NEUROLOGICAL SURGERY

## 2022-08-23 ASSESSMENT — PAIN SCALES - GENERAL: PAINLEVEL: EXTREME PAIN (9)

## 2022-08-23 NOTE — NURSING NOTE
"Reviewed pre- and post-operative instructions as outlined in the After Visit Summary/Patient Instructions with patient.   Surgery folder was given to patient    Patient Education Topic: Procedure with Dr. Alberts     Learner(s): Patient    Knowledge Level: Basic    Readiness to Learn: Ready    Method:  Verbal explanation    Outcome: Able to verbalize instructions    Barriers to Learning: No barrier    Covid Testing: patient educated they will need to have a test for the novel Coronavirus, COVID-19.The rapid antigen test needs to be completed within 1-2 days prior to surgery. Patient instructed to take a photo of the negative test and bring to surgery to show to the nurse. If positive, patient instructed to refer to the \"Before Your Procedure or Hospital Admission\" printout.    NDI/CLARISA: NDI/CLARISA not completed within last 6 months. Sent request for completion to support staff ([place]: [facility]surgery CSS Pool ([id number]))    Patient had the opportunity for questions to be answered. Advised Patient to call clinic with any questions/concerns. Gave patient antibacterial soap for pre-surgery skin preparation.       "

## 2022-08-23 NOTE — PROGRESS NOTES
Ms. Matute is an 87-year-old woman evaluated today for intractable right leg pain consistent with neurogenic claudication as well as occasional left leg symptoms to a lesser degree.  Her symptoms are present with prolonged standing or walking and improving to be lifestyle limiting.  She also has experienced similar symptoms when lying supine and attempting to sleep at night.  She was evaluated for this problem and treated with a course of physical therapy without improvement.  She notes that over the past several months her symptoms have increased significantly and that she is having difficulty participating in daily activities as a result of an inability to stand or walk any significant distance.  The symptoms tend to be relieved by sitting or lumbar flexion.    Her history is significant for a left L4-5 disc herniation treated surgically by me in 2012 with a recurrent disc herniation also removed by me in 2015.  She had no subsequent issues related to her left leg.    On examination, she is comfortable while seated, is able to arise from a seated to a standing position without difficulty or the need for assistance.  She has a slightly antalgic gait toward the right side.  On motor testing she has 5/5 strength throughout her bilateral lower extremities including plantar and dorsiflexion at the ankles while weightbearing.  Proximal bilateral lower extremity strength appears to be intact.  Deep tendon reflexes are diminished but symmetric at the knees and ankles bilaterally.  There are no long track findings.  Straight leg raising is negative bilaterally for radicular symptoms.  She complains of right buttock pain.  Corey's sign on the right is negative and she denies any pain over the region of the greater trochanter.    Her lumbar MRI scan performed at [facility] on April 21, 2022 shows disc bulge at L3-4 associated with significant facet ligamentous hypertrophy producing fairly severe bilateral lumbar  spinal stenosis.  The L4-5 level shows postoperative changes but no evidence of recurrent or residual disc herniation or other source of neurologic impingement.  There is neuroforaminal narrowing noted at left L3-4 and bilateral L4-5 but no compelling evidence of focal neurologic impingement at these levels.    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  I have reviewed the clinical and radiographic findings with the patient in some detail and discussed management alternatives including both surgical and nonsurgical means of management.  Given the circumstances I have recommended proceeding with a minimally invasive decompression at L3-4 utilizing a right-sided approach for bilateral decompression.  Possible right L4-5 microdiscectomy might also be needed.  I reviewed this recommendation in detail with the patient and discussed potential risks and benefits of the surgery.  I told her that the risks of the surgery include failure to improve her symptoms, increased pain weakness or numbness, injury to the nerve root or dural covering, infection, bleeding, recurrent or residual stenosis or disc herniation, etc.  She appeared to have a good understanding the situation, asked appropriate questions which I answered and wished to proceed with surgery as soon as practical.    I anticipate approximately 1 hour of general anesthesia and the surgery to be performed on an outpatient basis.    Approximately 30 minutes in total was spent reviewing the clinical and radiographic record and counseling the patient regarding differential diagnosis, management alternatives, risks and benefits.

## 2022-08-23 NOTE — PATIENT INSTRUCTIONS
"Patient Instructions  Surgery scheduled at [facility] for lumbar decompression at lumbar 3-4 for predominant right leg pain: right incision with Dr. Alberts  Pre-Operative  Surgical risks: blood clots in the leg or lung, problems urinating, nerve damage, drainage from the incision, infection,stiffness  Pre-operative physical with primary care physician within 30 days of surgical date.   Likely same day procedure with discharge home day of surgery, may stay for 23 hour observation hospitalization for monitoring.     Shower procedure  Please shower with antimicrobial soap the night before surgery and morning of surgery. Please refer to showering instruction sheet in folder.  Eating/Drinking  Stop all solid foods 8 hours before surgery.  Keep drinking clear liquids until 4 hours before surgery  Clear liquids include water, clear juice, black coffee, or clear tea without milk, Gatorade, clear soda.   Medications  Discontinue Aspirin, NSAIDs (Advil/Ibuprofen, Indocin, Naproxen,Nuprin,Relafen/Nabumetone, Diclofenac,Meloxicam, Aleve, Celebrex) x 7 days prior to surgical date  You can take Tylenol (Acetaminophen) for pain, 1000 mg  Do not exceed 3,000 mg per day   Any other medications prescribed, please discuss with your primary care provider at your pre-operative physical   As part of preparation for your upcoming procedure you are required to have a test for the novel Coronavirus, COVID-19  SDS Covid Testing: You will need to have a test for the novel Coronavirus, COVID-19.The rapid antigen test needs to be completed within 1-2 days prior to surgery. Please take a photo of the negative test and bring to surgery to show the nurse. If positive, please refer to the \"Before Your Procedure or Hospital Admission\" printout.  You may NOT receive the COVID-19 vaccine 72 hours before or after surgery.  Pain Management  Dealing with pain  As your body heals, you might feel a stabbing, burning, or aching pain. You " may also have some numbness.  Everyone feels pain differently, we may ask you to rate your pain using a pain scale. This will let us know how much pain you feel.   Keep in mind that medicine won't take away all of your pain. It helps to try other ways to relax and ease pain.   Things to help with pain  After surgery, we will give you medicine for your pain. These medications work well, but they can make you drowsy, itchy, or sick to your stomach. If we give you narcotics for pain, try to take the pills with food.   For mild to moderate pain, you can take medication such as Tylenol. These can be used with narcotics, but make sure that your narcotic does not contain Tylenol.   Do NOT drive while taking narcotic pain medication  Do NOT drink alcohol while using any pain medication  You can utilize ice as needed (no longer than 20 minutes at one time)  You may resume taking NSAIDs (ex. Ibuprofen, aleve, naproxen) 14 days post op   Incision Care  No submerging incision in water such as pools, hot tubs, baths for at least 8 weeks or until incision is healed  It is okay to shower, just pat the incision dry   Remove dressing as instructed upon discharge  Watch for signs of infection  Redness, swelling, warmth, drainage, and fever of 101 degrees or higher  Notify clinic [phone].  Activity Restrictions  For the first 6-8 weeks, no lifting > 10 pounds, limited bending, twisting, or overhead reaching.  Take stairs in moderation   Ok to walk as tolerated, take short frequent walks. You may gradually increase the distance as tolerated.   Avoid bed rest and prolonged sitting for longer than 30 minutes (change positions frequently while awake)  No contact sports until after follow up visit  No high impact activities such as; running/jogging, snowmobile or 4 cardona riding or any other recreational vehicles.  Post-Op Follow Up Appointments  2 week incision check with RN  6 week post op follow up visit with Physician Assistant  or Nurse Practitioner   Please call to schedule follow up appointment at [phone]  Resources  If you are currently employed, you will need to be off work for 2-4 weeks for post op recovery and healing.  Please fax any FMLA/short term disability paperwork to [phone]. You may call our clinic when you'd like to return to work and we can provide a work letter.   [facility]  Phone: [phone]  Fax: [phone]

## 2022-08-23 NOTE — LETTER
8/23/2022         RE: Amina Matute  [address]  [place] [address]        Dear Colleague,    Thank you for referring your patient, Amina Matute, to the [facility]. Please see a copy of my visit note below.    Ms. Matute is an 87-year-old woman evaluated today for intractable right leg pain consistent with neurogenic claudication as well as occasional left leg symptoms to a lesser degree.  Her symptoms are present with prolonged standing or walking and improving to be lifestyle limiting.  She also has experienced similar symptoms when lying supine and attempting to sleep at night.  She was evaluated for this problem and treated with a course of physical therapy without improvement.  She notes that over the past several months her symptoms have increased significantly and that she is having difficulty participating in daily activities as a result of an inability to stand or walk any significant distance.  The symptoms tend to be relieved by sitting or lumbar flexion.    Her history is significant for a left L4-5 disc herniation treated surgically by me in 2012 with a recurrent disc herniation also removed by me in 2015.  She had no subsequent issues related to her left leg.    On examination, she is comfortable while seated, is able to arise from a seated to a standing position without difficulty or the need for assistance.  She has a slightly antalgic gait toward the right side.  On motor testing she has 5/5 strength throughout her bilateral lower extremities including plantar and dorsiflexion at the ankles while weightbearing.  Proximal bilateral lower extremity strength appears to be intact.  Deep tendon reflexes are diminished but symmetric at the knees and ankles bilaterally.  There are no long track findings.  Straight leg raising is negative bilaterally for radicular symptoms.  She complains of right buttock pain.  Corey's sign on the right is negative  and she denies any pain over the region of the greater trochanter.    Her lumbar MRI scan performed at [facility] on April 21, 2022 shows disc bulge at L3-4 associated with significant facet ligamentous hypertrophy producing fairly severe bilateral lumbar spinal stenosis.  The L4-5 level shows postoperative changes but no evidence of recurrent or residual disc herniation or other source of neurologic impingement.  There is neuroforaminal narrowing noted at left L3-4 and bilateral L4-5 but no compelling evidence of focal neurologic impingement at these levels.    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  I have reviewed the clinical and radiographic findings with the patient in some detail and discussed management alternatives including both surgical and nonsurgical means of management.  Given the circumstances I have recommended proceeding with a minimally invasive decompression at L3-4 utilizing a right-sided approach for bilateral decompression.  Possible right L4-5 microdiscectomy might also be needed.  I reviewed this recommendation in detail with the patient and discussed potential risks and benefits of the surgery.  I told her that the risks of the surgery include failure to improve her symptoms, increased pain weakness or numbness, injury to the nerve root or dural covering, infection, bleeding, recurrent or residual stenosis or disc herniation, etc.  She appeared to have a good understanding the situation, asked appropriate questions which I answered and wished to proceed with surgery as soon as practical.    I anticipate approximately 1 hour of general anesthesia and the surgery to be performed on an outpatient basis.    Approximately 30 minutes in total was spent  reviewing the clinical and radiographic record and counseling the patient regarding differential diagnosis, management alternatives, risks and benefits.      Again, thank you for allowing me to participate in the care of your patient.        Sincerely,        Augustin Alberts MD

## 2024-11-13 NOTE — NURSING NOTE
Amina is here for a fasting medication recheck.     Pre-Visit Screening :  Immunizations : not up to date - Tdap    Colonoscopy : Not needed  Mammogram : Not needed  Asthma Action Test/Plan : NA  PHQ9/GAD7 :  NA    Pulse - regular  My Chart - declines    CLASSIFICATION OF OVERWEIGHT AND OBESITY BY BMI                         Obesity Class           BMI(kg/m2)  Underweight                                    < 18.5  Normal                                         18.5-24.9  Overweight                                     25.0-29.9  OBESITY                     I                  30.0-34.9                              II                 35.0-39.9  EXTREME OBESITY             III                >40                             Patient's  BMI Body mass index is 25.26 kg/(m^2).  http://hin.nhlbi.nih.gov/menuplanner/menu.cgi  Questioned patient about current smoking habits.  Pt. has never smoked.    JULIAN Mendoila ([facility])    
verbal instruction